# Patient Record
Sex: MALE | Race: WHITE | Employment: OTHER | URBAN - METROPOLITAN AREA
[De-identification: names, ages, dates, MRNs, and addresses within clinical notes are randomized per-mention and may not be internally consistent; named-entity substitution may affect disease eponyms.]

---

## 2017-05-24 PROBLEM — I71.40 ABDOMINAL AORTIC ANEURYSM (AAA) WITHOUT RUPTURE: Status: ACTIVE | Noted: 2017-05-24

## 2017-12-06 ENCOUNTER — HOSPITAL ENCOUNTER (OUTPATIENT)
Dept: CT IMAGING | Age: 72
Discharge: HOME | End: 2017-12-06
Payer: MEDICARE

## 2017-12-06 DIAGNOSIS — I71.40 AAA (ABDOMINAL AORTIC ANEURYSM): ICD-10-CM

## 2017-12-06 LAB — CREAT BLD-MCNC: 0.9 MG/DL (ref 0.6–1.3)

## 2017-12-06 PROCEDURE — 82565 ASSAY OF CREATININE: CPT

## 2017-12-06 PROCEDURE — 74011636320 HC RX REV CODE- 636/320

## 2017-12-06 PROCEDURE — 74174 CTA ABD&PLVS W/CONTRAST: CPT

## 2017-12-06 PROCEDURE — 74011000258 HC RX REV CODE- 258

## 2017-12-06 RX ORDER — SODIUM CHLORIDE 0.9 % (FLUSH) 0.9 %
10 SYRINGE (ML) INJECTION
Status: COMPLETED | OUTPATIENT
Start: 2017-12-06 | End: 2017-12-06

## 2017-12-06 RX ADMIN — SODIUM CHLORIDE 100 ML: 900 INJECTION, SOLUTION INTRAVENOUS at 15:20

## 2017-12-06 RX ADMIN — IOPAMIDOL 100 ML: 755 INJECTION, SOLUTION INTRAVENOUS at 15:20

## 2017-12-06 RX ADMIN — Medication 10 ML: at 15:20

## 2017-12-14 ENCOUNTER — HOSPITAL ENCOUNTER (OUTPATIENT)
Dept: GENERAL RADIOLOGY | Age: 72
Discharge: HOME | End: 2017-12-14
Payer: MEDICARE

## 2017-12-14 ENCOUNTER — HOSPITAL ENCOUNTER (OUTPATIENT)
Dept: PREADMISSION TESTING | Age: 72
Discharge: HOME | End: 2017-12-14
Payer: MEDICARE

## 2017-12-14 VITALS
RESPIRATION RATE: 22 BRPM | WEIGHT: 212.74 LBS | HEIGHT: 72 IN | SYSTOLIC BLOOD PRESSURE: 141 MMHG | OXYGEN SATURATION: 97 % | HEART RATE: 61 BPM | BODY MASS INDEX: 28.82 KG/M2 | TEMPERATURE: 98.1 F | DIASTOLIC BLOOD PRESSURE: 86 MMHG

## 2017-12-14 LAB
ABO + RH BLD: NORMAL
ALBUMIN SERPL-MCNC: 3.9 G/DL (ref 3.5–5)
ALBUMIN/GLOB SERPL: 1.3 {RATIO} (ref 1.1–2.2)
ALP SERPL-CCNC: 61 U/L (ref 45–117)
ALT SERPL-CCNC: 36 U/L (ref 12–78)
ANION GAP SERPL CALC-SCNC: 10 MMOL/L (ref 5–15)
APTT PPP: 35.5 SEC (ref 22.1–32.5)
AST SERPL-CCNC: 16 U/L (ref 15–37)
ATRIAL RATE: 59 BPM
BASOPHILS # BLD: 0 K/UL (ref 0–0.1)
BASOPHILS NFR BLD: 0 % (ref 0–1)
BILIRUB SERPL-MCNC: 0.4 MG/DL (ref 0.2–1)
BLOOD GROUP ANTIBODIES SERPL: NORMAL
BUN SERPL-MCNC: 16 MG/DL (ref 6–20)
BUN/CREAT SERPL: 18 (ref 12–20)
CALCIUM SERPL-MCNC: 9.2 MG/DL (ref 8.5–10.1)
CALCULATED P AXIS, ECG09: 34 DEGREES
CALCULATED R AXIS, ECG10: 6 DEGREES
CALCULATED T AXIS, ECG11: 34 DEGREES
CHLORIDE SERPL-SCNC: 106 MMOL/L (ref 97–108)
CO2 SERPL-SCNC: 27 MMOL/L (ref 21–32)
CREAT SERPL-MCNC: 0.9 MG/DL (ref 0.7–1.3)
DIAGNOSIS, 93000: NORMAL
EOSINOPHIL # BLD: 0.3 K/UL (ref 0–0.4)
EOSINOPHIL NFR BLD: 3 % (ref 0–7)
ERYTHROCYTE [DISTWIDTH] IN BLOOD BY AUTOMATED COUNT: 14.5 % (ref 11.5–14.5)
GLOBULIN SER CALC-MCNC: 3 G/DL (ref 2–4)
GLUCOSE SERPL-MCNC: 78 MG/DL (ref 65–100)
HCT VFR BLD AUTO: 44.8 % (ref 36.6–50.3)
HGB BLD-MCNC: 15.3 G/DL (ref 12.1–17)
INR PPP: 1 (ref 0.9–1.1)
LYMPHOCYTES # BLD: 2.4 K/UL (ref 0.8–3.5)
LYMPHOCYTES NFR BLD: 30 % (ref 12–49)
MCH RBC QN AUTO: 33.8 PG (ref 26–34)
MCHC RBC AUTO-ENTMCNC: 34.2 G/DL (ref 30–36.5)
MCV RBC AUTO: 99.1 FL (ref 80–99)
MONOCYTES # BLD: 0.8 K/UL (ref 0–1)
MONOCYTES NFR BLD: 10 % (ref 5–13)
NEUTS SEG # BLD: 4.6 K/UL (ref 1.8–8)
NEUTS SEG NFR BLD: 57 % (ref 32–75)
P-R INTERVAL, ECG05: 150 MS
PLATELET # BLD AUTO: 230 K/UL (ref 150–400)
POTASSIUM SERPL-SCNC: 4.5 MMOL/L (ref 3.5–5.1)
PROT SERPL-MCNC: 6.9 G/DL (ref 6.4–8.2)
PROTHROMBIN TIME: 10.5 SEC (ref 9–11.1)
Q-T INTERVAL, ECG07: 450 MS
QRS DURATION, ECG06: 106 MS
QTC CALCULATION (BEZET), ECG08: 445 MS
RBC # BLD AUTO: 4.52 M/UL (ref 4.1–5.7)
SODIUM SERPL-SCNC: 143 MMOL/L (ref 136–145)
SPECIMEN EXP DATE BLD: NORMAL
THERAPEUTIC RANGE,PTTT: ABNORMAL SECS (ref 58–77)
VENTRICULAR RATE, ECG03: 59 BPM
WBC # BLD AUTO: 8 K/UL (ref 4.1–11.1)

## 2017-12-14 PROCEDURE — 93005 ELECTROCARDIOGRAM TRACING: CPT

## 2017-12-14 PROCEDURE — 80053 COMPREHEN METABOLIC PANEL: CPT

## 2017-12-14 PROCEDURE — 86900 BLOOD TYPING SEROLOGIC ABO: CPT

## 2017-12-14 PROCEDURE — 85730 THROMBOPLASTIN TIME PARTIAL: CPT

## 2017-12-14 PROCEDURE — 36415 COLL VENOUS BLD VENIPUNCTURE: CPT

## 2017-12-14 PROCEDURE — 85025 COMPLETE CBC W/AUTO DIFF WBC: CPT

## 2017-12-14 PROCEDURE — 85610 PROTHROMBIN TIME: CPT

## 2017-12-14 PROCEDURE — 71020 XR CHEST PA LAT: CPT

## 2017-12-14 NOTE — PERIOP NOTES
[facility]  PREOPERATIVE INSTRUCTIONS    Surgery Date:   12/21/17      Surgery arrival time given by surgeon: NO  (If [facility] staff will call you between 3pm - 7pm the day before surgery with your arrival time. If your surgery is on a Monday, we will call you the preceding Friday. Please call [phone] after 7pm if you did not receive your arrival time.)  1. Report  to the 2nd Floor Admitting Desk on the day of your surgery. Bring your insurance card, photo identification, and any copayment (if applicable). 2. You must have a responsible adult to drive you home and stay with you the first 24 hours after surgery if you are going home the same day of your surgery. 3. Nothing to eat or drink after midnight the night before surgery. This means NO water, gum, mints, coffee, juice, etc.    4. MEDICATIONS TO TAKE THE MORNING OF SURGERY WITH A SIP OF WATER:  NONE  5. No alcoholic beverages 24 hours before and after your surgery. 6. If you are being admitted to the hospital,please leave personal belongings/luggage in your car until you have an assigned hospital room number. ( The hospital discharge time is 12 PM NOON. Your adult  should be at the hospital prior to the noon discharge time unless otherwise instructed.)   7. Do not take non-steroidal anti-inflammatory drugs (i.e. Ibuprofen, Naproxen, Advil, Aleve) as directed by your surgeon. You may take Tylenol. 8. Wear comfortable clothes. Wear your glasses instead of contacts. Please leave all money, jewelry and valuables at home. No make up, particularly mascara, the day of surgery. REMOVE ALL body piercings, rings,and jewelry and leave at home. Wear your hair loose or down, no pony-tails, buns, or any metal hair clips. 9. If you shower the morning of surgery, please do not apply any lotions, powders, or deodorants afterwards. Do not shave any body area within 24 hours of your surgery.   10. Please follow all instructions to avoid any potential surgical cancellation. 11. Should your physical condition change, (i.e. fever, cold, flu, etc.) please notify your surgeon as soon as possible. 12. It is important to be on time. If a situation occurs where you may be delayed, please call:  [phone] / [phone] on the day of surgery. 13. The Preadmission Testing staff can be reached at [phone]. 14. Special instructions: free  parking 7a-5p  15. The patient and spouse was contacted in person. He  verbalized understanding of all instructions does not  need reinforcement.

## 2017-12-20 ENCOUNTER — ANESTHESIA EVENT (OUTPATIENT)
Dept: SURGERY | Age: 72
DRG: 269 | End: 2017-12-20
Payer: MEDICARE

## 2017-12-21 ENCOUNTER — HOSPITAL ENCOUNTER (INPATIENT)
Age: 72
LOS: 1 days | Discharge: HOME | DRG: 269 | End: 2017-12-22
Payer: MEDICARE

## 2017-12-21 ENCOUNTER — ANESTHESIA (OUTPATIENT)
Dept: SURGERY | Age: 72
DRG: 269 | End: 2017-12-21
Payer: MEDICARE

## 2017-12-21 ENCOUNTER — APPOINTMENT (OUTPATIENT)
Dept: GENERAL RADIOLOGY | Age: 72
DRG: 269 | End: 2017-12-21
Payer: MEDICARE

## 2017-12-21 DIAGNOSIS — I71.40 ABDOMINAL AORTIC ANEURYSM (AAA) WITHOUT RUPTURE: Primary | ICD-10-CM

## 2017-12-21 PROCEDURE — 65660000000 HC RM CCU STEPDOWN

## 2017-12-21 PROCEDURE — 77030002933 HC SUT MCRYL J&J -A

## 2017-12-21 PROCEDURE — C1768 GRAFT, VASCULAR: HCPCS

## 2017-12-21 PROCEDURE — 77030005401 HC CATH RAD ARRO -A

## 2017-12-21 PROCEDURE — 76210000016 HC OR PH I REC 1 TO 1.5 HR

## 2017-12-21 PROCEDURE — C1769 GUIDE WIRE: HCPCS

## 2017-12-21 PROCEDURE — 77030004530 HC CATH ANGI DX IMGR BSC -A

## 2017-12-21 PROCEDURE — 74011636320 HC RX REV CODE- 636/320

## 2017-12-21 PROCEDURE — 77030016708 HC CATH ANGI DX SUPT2 CARD -B

## 2017-12-21 PROCEDURE — 77030011640 HC PAD GRND REM COVD -A

## 2017-12-21 PROCEDURE — 77030002986 HC SUT PROL J&J -A

## 2017-12-21 PROCEDURE — 77030003704 HC NDL VASC ACC ARMD -A

## 2017-12-21 PROCEDURE — 74011250636 HC RX REV CODE- 250/636: Performed by: ANESTHESIOLOGY

## 2017-12-21 PROCEDURE — 77030014647 HC SEAL FBRN TISSL BAXT -D

## 2017-12-21 PROCEDURE — 77030018673

## 2017-12-21 PROCEDURE — C1894 INTRO/SHEATH, NON-LASER: HCPCS

## 2017-12-21 PROCEDURE — 74011250636 HC RX REV CODE- 250/636

## 2017-12-21 PROCEDURE — 77030013079 HC BLNKT BAIR HGGR 3M -A: Performed by: ANESTHESIOLOGY

## 2017-12-21 PROCEDURE — 77030034850

## 2017-12-21 PROCEDURE — 77030010507 HC ADH SKN DERMBND J&J -B

## 2017-12-21 PROCEDURE — 77030031139 HC SUT VCRL2 J&J -A

## 2017-12-21 PROCEDURE — 77030013797 HC KT TRNSDUC PRSSR EDWD -A

## 2017-12-21 PROCEDURE — B41DYZZ FLUOROSCOPY OF AORTA AND BILATERAL LOWER EXTREMITY ARTERIES USING OTHER CONTRAST: ICD-10-PCS

## 2017-12-21 PROCEDURE — 04V03DZ RESTRICTION OF ABDOMINAL AORTA WITH INTRALUMINAL DEVICE, PERCUTANEOUS APPROACH: ICD-10-PCS

## 2017-12-21 PROCEDURE — 77030020256 HC SOL INJ NACL 0.9%  500ML

## 2017-12-21 PROCEDURE — 77030008771 HC TU NG SALEM SUMP -A: Performed by: ANESTHESIOLOGY

## 2017-12-21 PROCEDURE — 76001 XR FLUOROSCOPY OVER 60 MINUTES: CPT

## 2017-12-21 PROCEDURE — 77030020782 HC GWN BAIR PAWS FLX 3M -B

## 2017-12-21 PROCEDURE — 04703ZZ DILATION OF ABDOMINAL AORTA, PERCUTANEOUS APPROACH: ICD-10-PCS

## 2017-12-21 PROCEDURE — C1725 CATH, TRANSLUMIN NON-LASER: HCPCS

## 2017-12-21 PROCEDURE — 77030016570 HC BLNKT BAIR HGGR 3M -B

## 2017-12-21 PROCEDURE — 74011000250 HC RX REV CODE- 250

## 2017-12-21 PROCEDURE — 03HY32Z INSERTION OF MONITORING DEVICE INTO UPPER ARTERY, PERCUTANEOUS APPROACH: ICD-10-PCS

## 2017-12-21 PROCEDURE — 77030026438 HC STYL ET INTUB CARD -A: Performed by: ANESTHESIOLOGY

## 2017-12-21 PROCEDURE — 74011250637 HC RX REV CODE- 250/637

## 2017-12-21 PROCEDURE — 77030020061 HC IV BLD WRMR ADMIN SET 3M -B: Performed by: ANESTHESIOLOGY

## 2017-12-21 PROCEDURE — 76060000035 HC ANESTHESIA 2 TO 2.5 HR

## 2017-12-21 PROCEDURE — 76010000171 HC OR TIME 2 TO 2.5 HR INTENSV-TIER 1

## 2017-12-21 PROCEDURE — 77030008684 HC TU ET CUF COVD -B: Performed by: ANESTHESIOLOGY

## 2017-12-21 PROCEDURE — C1887 CATHETER, GUIDING: HCPCS

## 2017-12-21 DEVICE — STENT GRAFT ETLW1613C124E ENDUR II LIMB
Type: IMPLANTABLE DEVICE | Site: AORTA | Status: FUNCTIONAL
Brand: ENDURANT® II

## 2017-12-21 DEVICE — STENT GRAFT ESBF3214C103E ENDUR IIS BIF
Type: IMPLANTABLE DEVICE | Site: AORTA | Status: FUNCTIONAL
Brand: ENDURANT® IIS

## 2017-12-21 RX ORDER — SODIUM CHLORIDE, SODIUM LACTATE, POTASSIUM CHLORIDE, CALCIUM CHLORIDE 600; 310; 30; 20 MG/100ML; MG/100ML; MG/100ML; MG/100ML
125 INJECTION, SOLUTION INTRAVENOUS CONTINUOUS
Status: DISCONTINUED | OUTPATIENT
Start: 2017-12-21 | End: 2017-12-22 | Stop reason: HOSPADM

## 2017-12-21 RX ORDER — PROPOFOL 10 MG/ML
INJECTION, EMULSION INTRAVENOUS AS NEEDED
Status: DISCONTINUED | OUTPATIENT
Start: 2017-12-21 | End: 2017-12-21 | Stop reason: HOSPADM

## 2017-12-21 RX ORDER — ALLOPURINOL 300 MG/1
300 TABLET ORAL DAILY
Status: DISCONTINUED | OUTPATIENT
Start: 2017-12-21 | End: 2017-12-22 | Stop reason: HOSPADM

## 2017-12-21 RX ORDER — SODIUM CHLORIDE 0.9 % (FLUSH) 0.9 %
5-10 SYRINGE (ML) INJECTION AS NEEDED
Status: DISCONTINUED | OUTPATIENT
Start: 2017-12-21 | End: 2017-12-22 | Stop reason: HOSPADM

## 2017-12-21 RX ORDER — HYDROMORPHONE HYDROCHLORIDE 2 MG/ML
INJECTION, SOLUTION INTRAMUSCULAR; INTRAVENOUS; SUBCUTANEOUS AS NEEDED
Status: DISCONTINUED | OUTPATIENT
Start: 2017-12-21 | End: 2017-12-21 | Stop reason: HOSPADM

## 2017-12-21 RX ORDER — EPHEDRINE SULFATE 50 MG/ML
INJECTION, SOLUTION INTRAVENOUS AS NEEDED
Status: DISCONTINUED | OUTPATIENT
Start: 2017-12-21 | End: 2017-12-21 | Stop reason: HOSPADM

## 2017-12-21 RX ORDER — SODIUM CHLORIDE, SODIUM LACTATE, POTASSIUM CHLORIDE, CALCIUM CHLORIDE 600; 310; 30; 20 MG/100ML; MG/100ML; MG/100ML; MG/100ML
125 INJECTION, SOLUTION INTRAVENOUS CONTINUOUS
Status: DISCONTINUED | OUTPATIENT
Start: 2017-12-21 | End: 2017-12-21 | Stop reason: HOSPADM

## 2017-12-21 RX ORDER — CEFAZOLIN SODIUM IN 0.9 % NACL 2 G/50 ML
2 INTRAVENOUS SOLUTION, PIGGYBACK (ML) INTRAVENOUS ONCE
Status: COMPLETED | OUTPATIENT
Start: 2017-12-21 | End: 2017-12-21

## 2017-12-21 RX ORDER — LIDOCAINE HYDROCHLORIDE 10 MG/ML
0.1 INJECTION, SOLUTION EPIDURAL; INFILTRATION; INTRACAUDAL; PERINEURAL AS NEEDED
Status: DISCONTINUED | OUTPATIENT
Start: 2017-12-21 | End: 2017-12-21 | Stop reason: HOSPADM

## 2017-12-21 RX ORDER — ROCURONIUM BROMIDE 10 MG/ML
INJECTION, SOLUTION INTRAVENOUS AS NEEDED
Status: DISCONTINUED | OUTPATIENT
Start: 2017-12-21 | End: 2017-12-21 | Stop reason: HOSPADM

## 2017-12-21 RX ORDER — ONDANSETRON 2 MG/ML
4 INJECTION INTRAMUSCULAR; INTRAVENOUS AS NEEDED
Status: DISCONTINUED | OUTPATIENT
Start: 2017-12-21 | End: 2017-12-22 | Stop reason: HOSPADM

## 2017-12-21 RX ORDER — HYDROCODONE BITARTRATE AND ACETAMINOPHEN 5; 325 MG/1; MG/1
1 TABLET ORAL
Status: DISCONTINUED | OUTPATIENT
Start: 2017-12-21 | End: 2017-12-22 | Stop reason: HOSPADM

## 2017-12-21 RX ORDER — ONDANSETRON 2 MG/ML
INJECTION INTRAMUSCULAR; INTRAVENOUS AS NEEDED
Status: DISCONTINUED | OUTPATIENT
Start: 2017-12-21 | End: 2017-12-21 | Stop reason: HOSPADM

## 2017-12-21 RX ORDER — SODIUM CHLORIDE 0.9 % (FLUSH) 0.9 %
5-10 SYRINGE (ML) INJECTION EVERY 8 HOURS
Status: DISCONTINUED | OUTPATIENT
Start: 2017-12-21 | End: 2017-12-21 | Stop reason: HOSPADM

## 2017-12-21 RX ORDER — SODIUM CHLORIDE 0.9 % (FLUSH) 0.9 %
5-10 SYRINGE (ML) INJECTION EVERY 8 HOURS
Status: DISCONTINUED | OUTPATIENT
Start: 2017-12-21 | End: 2017-12-22 | Stop reason: HOSPADM

## 2017-12-21 RX ORDER — HYDROMORPHONE HYDROCHLORIDE 2 MG/ML
.5-1 INJECTION, SOLUTION INTRAMUSCULAR; INTRAVENOUS; SUBCUTANEOUS
Status: DISCONTINUED | OUTPATIENT
Start: 2017-12-21 | End: 2017-12-22 | Stop reason: HOSPADM

## 2017-12-21 RX ORDER — SODIUM CHLORIDE, SODIUM LACTATE, POTASSIUM CHLORIDE, CALCIUM CHLORIDE 600; 310; 30; 20 MG/100ML; MG/100ML; MG/100ML; MG/100ML
75 INJECTION, SOLUTION INTRAVENOUS CONTINUOUS
Status: DISCONTINUED | OUTPATIENT
Start: 2017-12-21 | End: 2017-12-22 | Stop reason: HOSPADM

## 2017-12-21 RX ORDER — SODIUM CHLORIDE, SODIUM LACTATE, POTASSIUM CHLORIDE, CALCIUM CHLORIDE 600; 310; 30; 20 MG/100ML; MG/100ML; MG/100ML; MG/100ML
INJECTION, SOLUTION INTRAVENOUS
Status: DISCONTINUED | OUTPATIENT
Start: 2017-12-21 | End: 2017-12-21 | Stop reason: HOSPADM

## 2017-12-21 RX ORDER — SUCCINYLCHOLINE CHLORIDE 20 MG/ML
INJECTION INTRAMUSCULAR; INTRAVENOUS AS NEEDED
Status: DISCONTINUED | OUTPATIENT
Start: 2017-12-21 | End: 2017-12-21 | Stop reason: HOSPADM

## 2017-12-21 RX ORDER — HYDROMORPHONE HYDROCHLORIDE 2 MG/ML
1 INJECTION, SOLUTION INTRAMUSCULAR; INTRAVENOUS; SUBCUTANEOUS
Status: DISCONTINUED | OUTPATIENT
Start: 2017-12-21 | End: 2017-12-22 | Stop reason: HOSPADM

## 2017-12-21 RX ORDER — FENTANYL CITRATE 50 UG/ML
INJECTION, SOLUTION INTRAMUSCULAR; INTRAVENOUS AS NEEDED
Status: DISCONTINUED | OUTPATIENT
Start: 2017-12-21 | End: 2017-12-21 | Stop reason: HOSPADM

## 2017-12-21 RX ORDER — PROTAMINE SULFATE 10 MG/ML
INJECTION, SOLUTION INTRAVENOUS AS NEEDED
Status: DISCONTINUED | OUTPATIENT
Start: 2017-12-21 | End: 2017-12-21 | Stop reason: HOSPADM

## 2017-12-21 RX ORDER — ASPIRIN 81 MG/1
81 TABLET ORAL DAILY
Status: DISCONTINUED | OUTPATIENT
Start: 2017-12-21 | End: 2017-12-22 | Stop reason: HOSPADM

## 2017-12-21 RX ORDER — DEXAMETHASONE SODIUM PHOSPHATE 4 MG/ML
INJECTION, SOLUTION INTRA-ARTICULAR; INTRALESIONAL; INTRAMUSCULAR; INTRAVENOUS; SOFT TISSUE AS NEEDED
Status: DISCONTINUED | OUTPATIENT
Start: 2017-12-21 | End: 2017-12-21 | Stop reason: HOSPADM

## 2017-12-21 RX ORDER — HEPARIN SODIUM 5000 [USP'U]/ML
5000 INJECTION, SOLUTION INTRAVENOUS; SUBCUTANEOUS EVERY 8 HOURS
Status: DISCONTINUED | OUTPATIENT
Start: 2017-12-21 | End: 2017-12-22 | Stop reason: HOSPADM

## 2017-12-21 RX ORDER — HEPARIN SODIUM 1000 [USP'U]/ML
INJECTION, SOLUTION INTRAVENOUS; SUBCUTANEOUS AS NEEDED
Status: DISCONTINUED | OUTPATIENT
Start: 2017-12-21 | End: 2017-12-21 | Stop reason: HOSPADM

## 2017-12-21 RX ORDER — SODIUM CHLORIDE 0.9 % (FLUSH) 0.9 %
5-10 SYRINGE (ML) INJECTION AS NEEDED
Status: DISCONTINUED | OUTPATIENT
Start: 2017-12-21 | End: 2017-12-21 | Stop reason: HOSPADM

## 2017-12-21 RX ORDER — LIDOCAINE HYDROCHLORIDE 20 MG/ML
INJECTION, SOLUTION EPIDURAL; INFILTRATION; INTRACAUDAL; PERINEURAL AS NEEDED
Status: DISCONTINUED | OUTPATIENT
Start: 2017-12-21 | End: 2017-12-21 | Stop reason: HOSPADM

## 2017-12-21 RX ORDER — MIDAZOLAM HYDROCHLORIDE 1 MG/ML
INJECTION, SOLUTION INTRAMUSCULAR; INTRAVENOUS AS NEEDED
Status: DISCONTINUED | OUTPATIENT
Start: 2017-12-21 | End: 2017-12-21 | Stop reason: HOSPADM

## 2017-12-21 RX ADMIN — HEPARIN SODIUM 5000 UNITS: 1000 INJECTION, SOLUTION INTRAVENOUS; SUBCUTANEOUS at 13:06

## 2017-12-21 RX ADMIN — EPHEDRINE SULFATE 7.5 MG: 50 INJECTION, SOLUTION INTRAVENOUS at 13:10

## 2017-12-21 RX ADMIN — EPHEDRINE SULFATE 5 MG: 50 INJECTION, SOLUTION INTRAVENOUS at 13:15

## 2017-12-21 RX ADMIN — LIDOCAINE HYDROCHLORIDE 50 MG: 20 INJECTION, SOLUTION EPIDURAL; INFILTRATION; INTRACAUDAL; PERINEURAL at 12:32

## 2017-12-21 RX ADMIN — Medication 10 ML: at 17:28

## 2017-12-21 RX ADMIN — FENTANYL CITRATE 50 MCG: 50 INJECTION, SOLUTION INTRAMUSCULAR; INTRAVENOUS at 12:59

## 2017-12-21 RX ADMIN — MIDAZOLAM HYDROCHLORIDE 2 MG: 1 INJECTION, SOLUTION INTRAMUSCULAR; INTRAVENOUS at 12:25

## 2017-12-21 RX ADMIN — ROCURONIUM BROMIDE 30 MG: 10 INJECTION, SOLUTION INTRAVENOUS at 12:47

## 2017-12-21 RX ADMIN — HYDROCODONE BITARTRATE AND ACETAMINOPHEN 1 TABLET: 5; 325 TABLET ORAL at 18:04

## 2017-12-21 RX ADMIN — SODIUM CHLORIDE, SODIUM LACTATE, POTASSIUM CHLORIDE, AND CALCIUM CHLORIDE 75 ML/HR: 600; 310; 30; 20 INJECTION, SOLUTION INTRAVENOUS at 15:01

## 2017-12-21 RX ADMIN — EPHEDRINE SULFATE 5 MG: 50 INJECTION, SOLUTION INTRAVENOUS at 13:53

## 2017-12-21 RX ADMIN — PROTAMINE SULFATE 25 MG: 10 INJECTION, SOLUTION INTRAVENOUS at 14:02

## 2017-12-21 RX ADMIN — ALLOPURINOL 300 MG: 300 TABLET ORAL at 17:27

## 2017-12-21 RX ADMIN — PROPOFOL 30 MG: 10 INJECTION, EMULSION INTRAVENOUS at 12:59

## 2017-12-21 RX ADMIN — ASPIRIN 81 MG: 81 TABLET, COATED ORAL at 17:27

## 2017-12-21 RX ADMIN — Medication 10 ML: at 22:00

## 2017-12-21 RX ADMIN — HYDROMORPHONE HYDROCHLORIDE 0.5 MG: 2 INJECTION, SOLUTION INTRAMUSCULAR; INTRAVENOUS; SUBCUTANEOUS at 13:42

## 2017-12-21 RX ADMIN — SODIUM CHLORIDE, SODIUM LACTATE, POTASSIUM CHLORIDE, AND CALCIUM CHLORIDE 125 ML/HR: 600; 310; 30; 20 INJECTION, SOLUTION INTRAVENOUS at 10:26

## 2017-12-21 RX ADMIN — CEFAZOLIN 2 G: 1 INJECTION, POWDER, FOR SOLUTION INTRAMUSCULAR; INTRAVENOUS; PARENTERAL at 12:47

## 2017-12-21 RX ADMIN — SODIUM CHLORIDE, SODIUM LACTATE, POTASSIUM CHLORIDE, CALCIUM CHLORIDE: 600; 310; 30; 20 INJECTION, SOLUTION INTRAVENOUS at 13:54

## 2017-12-21 RX ADMIN — FENTANYL CITRATE 50 MCG: 50 INJECTION, SOLUTION INTRAMUSCULAR; INTRAVENOUS at 14:10

## 2017-12-21 RX ADMIN — ONDANSETRON 4 MG: 2 INJECTION INTRAMUSCULAR; INTRAVENOUS at 12:40

## 2017-12-21 RX ADMIN — HEPARIN SODIUM 5000 UNITS: 5000 INJECTION, SOLUTION INTRAVENOUS; SUBCUTANEOUS at 22:00

## 2017-12-21 RX ADMIN — SODIUM CHLORIDE, SODIUM LACTATE, POTASSIUM CHLORIDE, CALCIUM CHLORIDE: 600; 310; 30; 20 INJECTION, SOLUTION INTRAVENOUS at 12:41

## 2017-12-21 RX ADMIN — EPHEDRINE SULFATE 10 MG: 50 INJECTION, SOLUTION INTRAVENOUS at 12:37

## 2017-12-21 RX ADMIN — ROCURONIUM BROMIDE 5 MG: 10 INJECTION, SOLUTION INTRAVENOUS at 12:32

## 2017-12-21 RX ADMIN — DEXAMETHASONE SODIUM PHOSPHATE 4 MG: 4 INJECTION, SOLUTION INTRA-ARTICULAR; INTRALESIONAL; INTRAMUSCULAR; INTRAVENOUS; SOFT TISSUE at 12:41

## 2017-12-21 RX ADMIN — FENTANYL CITRATE 100 MCG: 50 INJECTION, SOLUTION INTRAMUSCULAR; INTRAVENOUS at 12:32

## 2017-12-21 RX ADMIN — MIDAZOLAM HYDROCHLORIDE 2 MG: 1 INJECTION, SOLUTION INTRAMUSCULAR; INTRAVENOUS at 11:15

## 2017-12-21 RX ADMIN — SUCCINYLCHOLINE CHLORIDE 100 MG: 20 INJECTION INTRAMUSCULAR; INTRAVENOUS at 12:32

## 2017-12-21 RX ADMIN — FENTANYL CITRATE 50 MCG: 50 INJECTION, SOLUTION INTRAMUSCULAR; INTRAVENOUS at 12:50

## 2017-12-21 NOTE — PROGRESS NOTES
1520: TRANSFER - IN REPORT:    Verbal report received from Nate Carter, [facility] (name) on Nina Vitale  being received from [employer]) for routine progression of care      Report consisted of patients Situation, Background, Assessment and   Recommendations(SBAR). Information from the following report(s) SBAR, Procedure Summary, Intake/Output, MAR, Accordion, Recent Results, Med Rec Status and Cardiac Rhythm NSR w. PAC's was reviewed with the receiving nurse. Opportunity for questions and clarification was provided. Assessment completed upon patients arrival to unit and care assumed. 1548: Patient arrives IVCU 4 from PACU on bed. Patient is awake and alert, drowsy, answers questions appropriately. Denies pain. A line zeroed. VSS. Bilateral groin sites clean, dry, intact with primapore dressing. IVF infusing at 75ml/hr. Assessment complete. Bilateral pedal pulses palpable see flowsheet. Primary Nurse Diane Morales and Scherry Oppenheim, RN performed a dual skin assessment on this patient No impairment noted  Marcellus score is 18 . Scratches noted on left butt cheek and scattered on abdomen.

## 2017-12-21 NOTE — ANESTHESIA PREPROCEDURE EVALUATION
Anesthetic History   No history of anesthetic complications            Review of Systems / Medical History  Patient summary reviewed, nursing notes reviewed and pertinent labs reviewed    Pulmonary          Smoker         Neuro/Psych   Within defined limits           Cardiovascular              PAD (AAA, carotid stenosis, cerebral aneurysm s/p coiling)    Exercise tolerance: >4 METS     GI/Hepatic/Renal  Within defined limits              Endo/Other  Within defined limits           Other Findings   Comments:  shunt  Gout  BPH         Physical Exam    Airway  Mallampati: II  TM Distance: 4 - 6 cm  Neck ROM: normal range of motion   Mouth opening: Normal     Cardiovascular    Rhythm: regular  Rate: normal         Dental    Dentition: Full upper dentures and Full lower dentures     Pulmonary  Breath sounds clear to auscultation               Abdominal         Other Findings            Anesthetic Plan    ASA: 3  Anesthesia type: general          Induction: Intravenous  Anesthetic plan and risks discussed with: Patient

## 2017-12-21 NOTE — BRIEF OP NOTE
BRIEF OPERATIVE NOTE    Date of Procedure: 12/21/2017   Preoperative Diagnosis: AAA  Postoperative Diagnosis: AAA    Procedure(s):  ENDOVASCULAR AORTIC ABDOMINAL ANEURYSM REPAIR   Surgeon(s) and Role:     * Ciaran Smith MD - Primary         Assistant Staff:       Surgical Staff:  Circ-1: Stefani Casillas RN  Circ-2: Alexa Cote  Scrub Tech-1: Cindy Silva  Surg Asst-1: Peng Ibarra  Event Time In   Incision Start 1255   Incision Close 1420     Anesthesia: General   Estimated Blood Loss: 50  Specimens: * No specimens in log *   Findings: 0   Complications: 0  Implants:   Implant Name Type Inv.  Item Serial No.  Lot No. LRB No. Used Action   GRAFT STNT BIFUR [id number] -- ENDURANT II - [id number] Stent GRAFT STNT BIFUR [id number] -- ENDURANT II [id number] [employer] N/A N/A 1 Implanted   GRAFT STNT CONTRA [id number] -- ENDURANT II - [id number] Stent GRAFT STNT CONTRA [id number] -- ENDURANT II [id number] [employer] N/A N/A 1 Implanted   GRAFT STNT CONTRA [id number] -- ENDURANT II - [id number] Stent GRAFT Jolieen Girt [id number] -- ENDURANT II [id number] [employer]   N/A 1 Implanted

## 2017-12-21 NOTE — ANESTHESIA PROCEDURE NOTES
Arterial Line Placement    Start time: 12/21/2017 11:15 AM  End time: 12/21/2017 11:22 AM  Performed by: Jeancarlos López  Authorized by: Reinaldo ADAMS     Pre-Procedure  Indications:  Arterial pressure monitoring  Preanesthetic Checklist: patient identified, risks and benefits discussed, anesthesia consent, site marked, patient being monitored, timeout performed and patient being monitored    Timeout Time: 11:15        Procedure:   Prep:  Alcohol  Seldinger Technique?: Yes    Orientation:  Right  Location:  Radial artery  Catheter size:  20 G  Number of attempts:  2  Cont Cardiac Output Sensor: No      Assessment:   Post-procedure:  Line secured and sterile dressing applied  Patient Tolerance:  Patient tolerated the procedure well with no immediate complications

## 2017-12-21 NOTE — ANESTHESIA POSTPROCEDURE EVALUATION
Post-Anesthesia Evaluation and Assessment    Patient: Deepali Lazar MRN: [id number]  SSN: [id number]    YOB: 1945  Age: 67 y.o. Sex: male       Cardiovascular Function/Vital Signs  Visit Vitals    BP (!) 140/102    Pulse 72    Temp 36.6 °C (97.8 °F)    Resp 12    Ht 6' (1.829 m)    Wt 96.5 kg (212 lb 11.9 oz)    SpO2 94%    BMI 28.85 kg/m2       Patient is status post general anesthesia for Procedure(s):  ENDOVASCULAR AORTIC ABDOMINAL ANEURYSM REPAIR . Nausea/Vomiting: None    Postoperative hydration reviewed and adequate. Pain:  Pain Scale 1: Numeric (0 - 10) (12/21/17 1833)  Pain Intensity 1: 0 (12/21/17 1833)   Managed    Neurological Status:   Neuro (WDL): Exceptions to WDL (12/21/17 1520)  Neuro  Neurologic State: Alert;Drowsy; Eyes open spontaneously (12/21/17 1553)  Orientation Level: Oriented X4 (12/21/17 1553)  Cognition: Appropriate decision making; Appropriate for age attention/concentration; Appropriate safety awareness;Decreased attention/concentration; Follows commands;Recognition of people/places (12/21/17 1553)  Speech: Clear (12/21/17 1553)  LUE Motor Response: Purposeful (12/21/17 1553)  LLE Motor Response: Purposeful (12/21/17 1553)  RUE Motor Response: Purposeful (12/21/17 1553)  RLE Motor Response: Purposeful (12/21/17 1553)   At baseline    Mental Status and Level of Consciousness: Arousable    Pulmonary Status:   O2 Device: Nasal cannula (12/21/17 1553)   Adequate oxygenation and airway patent    Complications related to anesthesia: None    Post-anesthesia assessment completed.  No concerns    Signed By: Rodrigo Paulson MD     December 21, 2017

## 2017-12-21 NOTE — IP AVS SNAPSHOT
[address] [address] [phone] Patient: Russ Wang MRN: [id number] FAT:5/69/5254 My Medications TAKE these medications as instructed Instructions Each Dose to Equal  
 Morning Noon Evening Bedtime  
 allopurinol 300 mg tablet Commonly known as:  Deyabandar Dasilvaber Your last dose was: Your next dose is: TAKE 1 TAB BY MOUTH DAILY. ASPIRIN LOW DOSE PO Your last dose was: Your next dose is: Take 81 mg by mouth daily. 81 mg  
    
   
   
   
  
 cholecalciferol 1,000 unit Cap Commonly known as:  VITAMIN D3 Your last dose was: Your next dose is: Take 1,000 Units by mouth daily. 1000 Units  
    
   
   
   
  
 ergocalciferol 50,000 unit capsule Commonly known as:  ERGOCALCIFEROL Your last dose was: Your next dose is: TAKE 1 CAPSULE BY MOUTH WEEKLY HYDROcodone-acetaminophen 5-325 mg per tablet Commonly known as:  Deepa Wolf Your last dose was: Your next dose is: Take 1 Tab by mouth every four (4) hours as needed. Max Daily Amount: 6 Tabs. 1 Tab Where to Get Your Medications Information on where to get these meds will be given to you by the nurse or doctor. ! Ask your nurse or doctor about these medications HYDROcodone-acetaminophen 5-325 mg per tablet

## 2017-12-21 NOTE — PROGRESS NOTES
Problem: Falls - Risk of  Goal: *Absence of Falls  Document Armando Fall Risk and appropriate interventions in the flowsheet.    Outcome: Progressing Towards Goal  Fall Risk Interventions:  Mobility Interventions: Bed/chair exit alarm, Communicate number of staff needed for ambulation/transfer, OT consult for ADLs, Patient to call before getting OOB, PT Consult for mobility concerns, PT Consult for assist device competence         Medication Interventions: Assess postural VS orthostatic hypotension, Bed/chair exit alarm, Evaluate medications/consider consulting pharmacy, Patient to call before getting OOB, Teach patient to arise slowly, Utilize gait belt for transfers/ambulation    Elimination Interventions: Bed/chair exit alarm, Call light in reach, Patient to call for help with toileting needs, Toilet paper/wipes in reach, Toileting schedule/hourly rounds

## 2017-12-21 NOTE — ROUTINE PROCESS
TRANSFER - OUT REPORT:    Verbal report given to [facility] [address] on [employer]  being transferred to [place] [address] for routine post - op       Report consisted of patients Situation, Background, Assessment and   Recommendations(SBAR). Information from the following report(s) SBAR and OR Summary was reviewed with the receiving nurse. Lines:   Peripheral IV 12/21/17 Left Hand (Active)   Site Assessment Clean, dry, & intact 12/21/2017  2:35 PM   Phlebitis Assessment 0 12/21/2017  2:35 PM   Infiltration Assessment 0 12/21/2017  2:35 PM   Dressing Status Clean, dry, & intact 12/21/2017  2:35 PM   Dressing Type Tape;Transparent 12/21/2017  2:35 PM   Hub Color/Line Status Pink 12/21/2017  2:35 PM   Action Taken Open ports on tubing capped 12/21/2017 10:25 AM   Alcohol Cap Used Yes 12/21/2017 10:25 AM       Peripheral IV 12/21/17 Right Forearm (Active)   Site Assessment Clean, dry, & intact 12/21/2017  2:35 PM   Phlebitis Assessment 0 12/21/2017  2:35 PM   Infiltration Assessment 0 12/21/2017  2:35 PM   Dressing Status Clean, dry, & intact 12/21/2017  2:35 PM   Dressing Type Tape;Transparent 12/21/2017  2:35 PM   Hub Color/Line Status Green 12/21/2017  2:35 PM       Arterial Line 12/21/17 Right Radial artery (Active)   Site Assessment Clean, dry, & intact 12/21/2017  2:35 PM   Dressing Status Clean, dry, & intact 12/21/2017  2:35 PM   Dressing Type Tape;Transparent 12/21/2017  2:35 PM   Line Status Intact and in place 12/21/2017  2:35 PM   Treatment Zeroed or re-zeroed 12/21/2017  2:35 PM   Affected Extremity/Extremities Color distal to insertion site pink (or appropriate for race) 12/21/2017  2:35 PM        Opportunity for questions and clarification was provided.       Patient transported with:   Monitor  O2 @ 3 liters  Registered Nurse

## 2017-12-21 NOTE — IP AVS SNAPSHOT
[address] [facility] [address] [address] 
[phone] Patient: Darrick Grover MRN: [id number] UD About your hospitalization You were admitted on:  2017 You last received care in the:  [facility] You were discharged on:  2017 Why you were hospitalized Your primary diagnosis was:  Not on File Your diagnoses also included:  Aaa (Abdominal Aortic Aneurysm) (Hcc) Things You Need To Do (next 8 weeks) Follow up with Addy Nguyen MD  
  
Phone:  [phone] Where:  [address] [place] [phone] Discharge Orders None A check michelle indicates which time of day the medication should be taken. My Medications TAKE these medications as instructed Instructions Each Dose to Equal  
 Morning Noon Evening Bedtime  
 allopurinol 300 mg tablet Commonly known as:  Silvana Dakins Your last dose was: Your next dose is: TAKE 1 TAB BY MOUTH DAILY. ASPIRIN LOW DOSE PO Your last dose was: Your next dose is: Take 81 mg by mouth daily. 81 mg  
    
   
   
   
  
 cholecalciferol 1,000 unit Cap Commonly known as:  VITAMIN D3 Your last dose was: Your next dose is: Take 1,000 Units by mouth daily. 1000 Units  
    
   
   
   
  
 ergocalciferol 50,000 unit capsule Commonly known as:  ERGOCALCIFEROL Your last dose was: Your next dose is: TAKE 1 CAPSULE BY MOUTH WEEKLY HYDROcodone-acetaminophen 5-325 mg per tablet Commonly known as:  Jagdish Dolphin Your last dose was: Your next dose is: Take 1 Tab by mouth every four (4) hours as needed. Max Daily Amount: 6 Tabs. 1 Tab Where to Get Your Medications Information on where to get these meds will be given to you by the nurse or doctor. ! Ask your nurse or doctor about these medications HYDROcodone-acetaminophen 5-325 mg per tablet Discharge Instructions Patient Discharge Instructions Arya Yoder / [id number] : 1945 Admitted 2017 Discharged: 2017 Take Home Medications · It is important that you take the medication exactly as they are prescribed. · Keep your medication in the bottles provided by the pharmacist and keep a list of the medication names, dosages, and times to be taken in your wallet. · Do not take other medications without consulting your doctor. What to do at [facility] Recommended diet: Regular Diet, Recommended activity: Activity as tolerated, Follow-up with Dr Romario Smith in 2 weeks Information obtained by : 
I understand that if any problems occur once I am at home I am to contact my physician. I understand and acknowledge receipt of the instructions indicated above. Physician's or R.N.'s Signature                                                                  Date/Time Patient or Representative Signature                                                          Date/Time [facility] Announcement We are excited to announce that we are making your provider's discharge notes available to you in [employer]. You will see these notes when they are completed and signed by the physician that discharged you from your recent hospital stay.   If you have any questions or concerns about any information you see in [place], please call the [employer] [facility] Information Department where you were seen or reach out to your Primary Care Provider for more information about your plan of care. Introducing [facility]! Trace Alatorre introduces [employer] patient portal. Now you can access parts of your medical record, email your doctor's office, and request medication refills online. 1. In your internet browser, go to [email or web address] 2. Click on the First Time User? Click Here link in the Sign In box. You will see the New Member Sign Up page. 3. Enter your [facility] Access Code exactly as it appears below. You will not need to use this code after youve completed the sign-up process. If you do not sign up before the expiration date, you must request a new code. · [employer] Access Code: [email or web address] Expires: 3/14/2018 10:19 AM 
 
4. Enter the last four digits of your Social Security Number (xxxx) and Date of Birth (mm/dd/yyyy) as indicated and click Submit. You will be taken to the next sign-up page. 5. Create a [facility] ID. This will be your [facility] login ID and cannot be changed, so think of one that is secure and easy to remember. 6. Create a [facility] password. You can change your password at any time. 7. Enter your Password Reset Question and Answer. This can be used at a later time if you forget your password. 8. Enter your e-mail address. You will receive e-mail notification when new information is available in [address] 9. Click Sign Up. You can now view and download portions of your medical record. 10. Click the Download Summary menu link to download a portable copy of your medical information. If you have questions, please visit the Frequently Asked Questions section of the [employer] website. Remember, [facility] is NOT to be used for urgent needs. For medical emergencies, dial 911. Now available from your iPhone and Android! Unresulted Labs-Please follow up with your PCP about these lab tests Order Current Status CULTURE, MRSA In process Providers Seen During Your Hospitalization Provider Specialty Primary office phone Cassandra Guy MD General and Vascular Surgery [phone] Your Primary Care Physician (PCP) Primary Care Physician Office Phone Office Fax Faith Sotelo [phone] You are allergic to the following No active allergies Recent Documentation Height Weight BMI Smoking Status 1.829 m 96.5 kg 28.85 kg/m2 Current Every Day Smoker Emergency Contacts Name Discharge Info Relation Home Work Mobile [address] CAREGIVER [3] Spouse [3] [phone] Patient Belongings The following personal items are in your possession at time of discharge: 
  Dental Appliances: Lowers, Uppers, With patient  Visual Aid: Glasses, With patient   Hearing Aids/Status: Does not own  Home Medications: None   Jewelry: None  Clothing: Pants, Undergarments, Shirt, Footwear, Socks, Jacket/Coat (placed in PACU)    Other Valuables: Cell Phone, Karli White (given to wife) Please provide this summary of care documentation to your next provider. Signatures-by signing, you are acknowledging that this After Visit Summary has been reviewed with you and you have received a copy. Patient Signature:  ____________________________________________________________ Date:  ____________________________________________________________  
  
Pieter Artist Provider Signature:  ____________________________________________________________ Date:  ____________________________________________________________

## 2017-12-22 VITALS
TEMPERATURE: 98.4 F | DIASTOLIC BLOOD PRESSURE: 89 MMHG | SYSTOLIC BLOOD PRESSURE: 135 MMHG | WEIGHT: 212.74 LBS | BODY MASS INDEX: 28.82 KG/M2 | RESPIRATION RATE: 16 BRPM | HEIGHT: 72 IN | OXYGEN SATURATION: 93 % | HEART RATE: 69 BPM

## 2017-12-22 LAB
BACTERIA SPEC CULT: NORMAL
BACTERIA SPEC CULT: NORMAL
BASOPHILS # BLD: 0 K/UL (ref 0–0.1)
BASOPHILS NFR BLD: 0 % (ref 0–1)
EOSINOPHIL # BLD: 0 K/UL (ref 0–0.4)
EOSINOPHIL NFR BLD: 0 % (ref 0–7)
ERYTHROCYTE [DISTWIDTH] IN BLOOD BY AUTOMATED COUNT: 14.2 % (ref 11.5–14.5)
HCT VFR BLD AUTO: 37.3 % (ref 36.6–50.3)
HGB BLD-MCNC: 12.8 G/DL (ref 12.1–17)
LYMPHOCYTES # BLD: 1.4 K/UL (ref 0.8–3.5)
LYMPHOCYTES NFR BLD: 11 % (ref 12–49)
MCH RBC QN AUTO: 33.2 PG (ref 26–34)
MCHC RBC AUTO-ENTMCNC: 34.3 G/DL (ref 30–36.5)
MCV RBC AUTO: 96.9 FL (ref 80–99)
MONOCYTES # BLD: 1.1 K/UL (ref 0–1)
MONOCYTES NFR BLD: 8 % (ref 5–13)
NEUTS SEG # BLD: 10.3 K/UL (ref 1.8–8)
NEUTS SEG NFR BLD: 81 % (ref 32–75)
PLATELET # BLD AUTO: 199 K/UL (ref 150–400)
RBC # BLD AUTO: 3.85 M/UL (ref 4.1–5.7)
SERVICE CMNT-IMP: NORMAL
WBC # BLD AUTO: 12.8 K/UL (ref 4.1–11.1)

## 2017-12-22 PROCEDURE — 74011250637 HC RX REV CODE- 250/637

## 2017-12-22 PROCEDURE — 36415 COLL VENOUS BLD VENIPUNCTURE: CPT

## 2017-12-22 PROCEDURE — 77010033678 HC OXYGEN DAILY

## 2017-12-22 PROCEDURE — 85025 COMPLETE CBC W/AUTO DIFF WBC: CPT

## 2017-12-22 PROCEDURE — 74011250636 HC RX REV CODE- 250/636

## 2017-12-22 RX ORDER — HYDROCODONE BITARTRATE AND ACETAMINOPHEN 5; 325 MG/1; MG/1
1 TABLET ORAL
Qty: 30 TAB | Refills: 0 | Status: SHIPPED | OUTPATIENT
Start: 2017-12-22 | End: 2018-05-29

## 2017-12-22 RX ADMIN — HEPARIN SODIUM 5000 UNITS: 5000 INJECTION, SOLUTION INTRAVENOUS; SUBCUTANEOUS at 05:30

## 2017-12-22 RX ADMIN — ASPIRIN 81 MG: 81 TABLET, COATED ORAL at 09:22

## 2017-12-22 RX ADMIN — ALLOPURINOL 300 MG: 300 TABLET ORAL at 09:22

## 2017-12-22 RX ADMIN — Medication 10 ML: at 05:20

## 2017-12-22 NOTE — ROUTINE PROCESS
0700 Shift change report received from Jose Man [facility]. SBAR, Kardex, Procedure Summary, Intake/Output, MAR, Accordion, Recent Results and Cardiac Rhythm SR reviewed.

## 2017-12-22 NOTE — DISCHARGE INSTRUCTIONS
Patient Discharge Instructions    Giles Wallace / [id number] : 1945    Admitted 2017 Discharged: 2017     Take Home Medications            · It is important that you take the medication exactly as they are prescribed. · Keep your medication in the bottles provided by the pharmacist and keep a list of the medication names, dosages, and times to be taken in your wallet. · Do not take other medications without consulting your doctor. What to do at Home    Recommended diet: Regular Diet,     Recommended activity: Activity as tolerated,      Follow-up with Dr Holly Nguyen in 2 weeks        Information obtained by :  I understand that if any problems occur once I am at home I am to contact my physician. I understand and acknowledge receipt of the instructions indicated above.                                                                                                                                            Physician's or R.N.'s Signature                                                                  Date/Time                                                                                                                                              Patient or Representative Signature                                                          Date/Time

## 2017-12-22 NOTE — OP NOTES
[address] REPORT    David Nolen  MR#: [id number]  : 1945  ACCOUNT #: [de-identified]   DATE OF SERVICE: 2017    PREOPERATIVE DIAGNOSIS:  Abdominal aortic aneurysm. POSTOPERATIVE DIAGNOSIS: Abdominal aortic aneurysm. PROCEDURES PERFORMED  1. Endovascular repair of abdominal aortic aneurysm using modular bifurcated device. 2.  Bilateral nonselective catheterization of the aorta. 3.  Bilateral exposure of common femoral arteries. SURGEON:  Franchesca Pace MD    ANESTHESIA:  General anesthesia. COMPLICATIONS:  None. ESTIMATED BLOOD LOSS:  100 mL. SPECIMENS REMOVED:  None. INDICATION FOR PROCEDURE:  The patient is a middle-aged male with an aortic aneurysm. The decision was made to take him to the operating room for repair. TECHNICAL DETAILS:  Patient was taken to the operating room, and suitable level of anesthesia was induced; he was prepped and draped in the typical sterile fashion. Oblique incisions were made in both groins. Dissection was carried out down to the common femoral artery, which was dissected free of the soft tissue attachments. Sheaths were placed and pigtail advanced into the aorta. An angiogram was performed, localizing bilateral renal vessels. The device was then deployed, just inferior to the lower left renal artery. Contralateral gate was cannulated, and the docking prosthesis placed; main body prosthesis was subsequently deployed in the usual fashion. Balloon angioplasty was performed of all of the attachment points. Completion angiography showed a delayed type 2 endoleak of a mild nature without evidence of any complication. Vessels were closed with interrupted suture, and wounds irrigated thoroughly and closed in multiple layers. He tolerated the procedures well. Sponge and instrument counts were correct times 2.       He was awakened and transferred to recovery room in good condition.       MD YASMIN Martinez / TRINI  D: 12/21/2017 14:42     T: 12/22/2017 01:32  JOB #: [id number]

## 2017-12-22 NOTE — PROGRESS NOTES
Shift Summary    0710:  Patient resting quietly in bed. He is on room air. No signs of distress noted. Rojas draining to gravity. Call bell within reach. 0900: Rojas removed per orders. 0930:  A-line oozing blood. Patient adamant that the line come out. A-Line removed. 1000:  Dr. Harmony Pan at the bedside assessing patient. If patient able to urinate and walk, he may be discharged. 1045:  Patient able to void post rojas removal.  He was able to get out of bed and walk without difficulty. 1115:  Patient discharge home. Wife here to pick him up.

## 2017-12-22 NOTE — ROUTINE PROCESS
12/22/17   11:21AM  Post-op appt scheduled with Dr. Buck Boast on 1/5/18 at 10:30AM. Appt added to AVS. Rohan Coats CM Specialist

## 2017-12-22 NOTE — ROUTINE PROCESS
@4583 Bedside and Verbal shift change report given to Yee Nicole RN (oncoming nurse) by Florence Villarreal RN (offgoing nurse). Report included the following information SBAR, Kardex, ED Summary, Procedure Summary, Intake/Output, MAR, Accordion, Recent Results, Med Rec Status, Cardiac Rhythm Sinus and Alarm Parameters . @3834 Bedside and Verbal shift change report given to Long Hendrix RN (oncoming nurse) by Elka Park Nicole RN (offgoing nurse). Report included the following information SBAR, Kardex, ED Summary, Procedure Summary, Intake/Output, MAR, Accordion, Recent Results, Med Rec Status, Cardiac Rhythm Sinus and Alarm Parameters .

## 2018-01-16 ENCOUNTER — HOSPITAL ENCOUNTER (OUTPATIENT)
Dept: GENERAL RADIOLOGY | Age: 73
Discharge: HOME | End: 2018-01-16
Attending: INTERNAL MEDICINE
Payer: MEDICARE

## 2018-01-16 DIAGNOSIS — M54.5 LOW BACK PAIN, UNSPECIFIED BACK PAIN LATERALITY, UNSPECIFIED CHRONICITY, WITH SCIATICA PRESENCE UNSPECIFIED: ICD-10-CM

## 2018-01-16 PROBLEM — R41.3 SHORT-TERM MEMORY LOSS: Status: ACTIVE | Noted: 2018-01-16

## 2018-01-16 PROCEDURE — 72100 X-RAY EXAM L-S SPINE 2/3 VWS: CPT

## 2018-05-29 PROBLEM — E78.00 HYPERCHOLESTEREMIA: Status: ACTIVE | Noted: 2018-05-29

## 2021-08-11 ENCOUNTER — ANESTHESIA EVENT (OUTPATIENT)
Dept: ENDOSCOPY | Age: 76
End: 2021-08-11
Payer: MEDICARE

## 2021-08-11 ENCOUNTER — HOSPITAL ENCOUNTER (OUTPATIENT)
Age: 76
Setting detail: OUTPATIENT SURGERY
Discharge: HOME | End: 2021-08-11
Attending: INTERNAL MEDICINE | Admitting: INTERNAL MEDICINE
Payer: MEDICARE

## 2021-08-11 ENCOUNTER — ANESTHESIA (OUTPATIENT)
Dept: ENDOSCOPY | Age: 76
End: 2021-08-11
Payer: MEDICARE

## 2021-08-11 VITALS
SYSTOLIC BLOOD PRESSURE: 116 MMHG | OXYGEN SATURATION: 96 % | BODY MASS INDEX: 29.8 KG/M2 | HEART RATE: 64 BPM | WEIGHT: 220 LBS | DIASTOLIC BLOOD PRESSURE: 85 MMHG | TEMPERATURE: 98.5 F | HEIGHT: 72 IN | RESPIRATION RATE: 18 BRPM

## 2021-08-11 PROCEDURE — 77030009426 HC FCPS BIOP ENDOSC BSC -B: Performed by: INTERNAL MEDICINE

## 2021-08-11 PROCEDURE — 76060000031 HC ANESTHESIA FIRST 0.5 HR: Performed by: INTERNAL MEDICINE

## 2021-08-11 PROCEDURE — 88305 TISSUE EXAM BY PATHOLOGIST: CPT

## 2021-08-11 PROCEDURE — 2709999900 HC NON-CHARGEABLE SUPPLY: Performed by: INTERNAL MEDICINE

## 2021-08-11 PROCEDURE — 74011000250 HC RX REV CODE- 250: Performed by: STUDENT IN AN ORGANIZED HEALTH CARE EDUCATION/TRAINING PROGRAM

## 2021-08-11 PROCEDURE — 77030021593 HC FCPS BIOP ENDOSC BSC -A: Performed by: INTERNAL MEDICINE

## 2021-08-11 PROCEDURE — 74011250636 HC RX REV CODE- 250/636: Performed by: STUDENT IN AN ORGANIZED HEALTH CARE EDUCATION/TRAINING PROGRAM

## 2021-08-11 PROCEDURE — 76040000019: Performed by: INTERNAL MEDICINE

## 2021-08-11 RX ORDER — SODIUM CHLORIDE 9 MG/ML
50 INJECTION, SOLUTION INTRAVENOUS CONTINUOUS
Status: DISCONTINUED | OUTPATIENT
Start: 2021-08-11 | End: 2021-08-11 | Stop reason: HOSPADM

## 2021-08-11 RX ORDER — ATROPINE SULFATE 0.1 MG/ML
0.5 INJECTION INTRAVENOUS
Status: DISCONTINUED | OUTPATIENT
Start: 2021-08-11 | End: 2021-08-11 | Stop reason: HOSPADM

## 2021-08-11 RX ORDER — EPINEPHRINE 0.1 MG/ML
1 INJECTION INTRACARDIAC; INTRAVENOUS
Status: DISCONTINUED | OUTPATIENT
Start: 2021-08-11 | End: 2021-08-11 | Stop reason: HOSPADM

## 2021-08-11 RX ORDER — PROPOFOL 10 MG/ML
INJECTION, EMULSION INTRAVENOUS AS NEEDED
Status: DISCONTINUED | OUTPATIENT
Start: 2021-08-11 | End: 2021-08-11 | Stop reason: HOSPADM

## 2021-08-11 RX ORDER — FLUMAZENIL 0.1 MG/ML
0.2 INJECTION INTRAVENOUS
Status: DISCONTINUED | OUTPATIENT
Start: 2021-08-11 | End: 2021-08-11 | Stop reason: HOSPADM

## 2021-08-11 RX ORDER — FENTANYL CITRATE 50 UG/ML
25-200 INJECTION, SOLUTION INTRAMUSCULAR; INTRAVENOUS
Status: DISCONTINUED | OUTPATIENT
Start: 2021-08-11 | End: 2021-08-11 | Stop reason: HOSPADM

## 2021-08-11 RX ORDER — LIDOCAINE HYDROCHLORIDE 20 MG/ML
INJECTION, SOLUTION EPIDURAL; INFILTRATION; INTRACAUDAL; PERINEURAL AS NEEDED
Status: DISCONTINUED | OUTPATIENT
Start: 2021-08-11 | End: 2021-08-11 | Stop reason: HOSPADM

## 2021-08-11 RX ORDER — SODIUM CHLORIDE 0.9 % (FLUSH) 0.9 %
5-40 SYRINGE (ML) INJECTION EVERY 8 HOURS
Status: DISCONTINUED | OUTPATIENT
Start: 2021-08-11 | End: 2021-08-11 | Stop reason: HOSPADM

## 2021-08-11 RX ORDER — PHENYLEPHRINE HCL IN 0.9% NACL 0.4MG/10ML
SYRINGE (ML) INTRAVENOUS AS NEEDED
Status: DISCONTINUED | OUTPATIENT
Start: 2021-08-11 | End: 2021-08-11 | Stop reason: HOSPADM

## 2021-08-11 RX ORDER — DEXTROMETHORPHAN/PSEUDOEPHED 2.5-7.5/.8
1.2 DROPS ORAL
Status: DISCONTINUED | OUTPATIENT
Start: 2021-08-11 | End: 2021-08-11 | Stop reason: HOSPADM

## 2021-08-11 RX ORDER — MIDAZOLAM HYDROCHLORIDE 1 MG/ML
.25-5 INJECTION, SOLUTION INTRAMUSCULAR; INTRAVENOUS
Status: DISCONTINUED | OUTPATIENT
Start: 2021-08-11 | End: 2021-08-11 | Stop reason: HOSPADM

## 2021-08-11 RX ORDER — SODIUM CHLORIDE 0.9 % (FLUSH) 0.9 %
5-40 SYRINGE (ML) INJECTION AS NEEDED
Status: DISCONTINUED | OUTPATIENT
Start: 2021-08-11 | End: 2021-08-11 | Stop reason: HOSPADM

## 2021-08-11 RX ORDER — SODIUM CHLORIDE, SODIUM LACTATE, POTASSIUM CHLORIDE, CALCIUM CHLORIDE 600; 310; 30; 20 MG/100ML; MG/100ML; MG/100ML; MG/100ML
INJECTION, SOLUTION INTRAVENOUS
Status: DISCONTINUED | OUTPATIENT
Start: 2021-08-11 | End: 2021-08-11 | Stop reason: HOSPADM

## 2021-08-11 RX ORDER — NALOXONE HYDROCHLORIDE 0.4 MG/ML
0.4 INJECTION, SOLUTION INTRAMUSCULAR; INTRAVENOUS; SUBCUTANEOUS
Status: DISCONTINUED | OUTPATIENT
Start: 2021-08-11 | End: 2021-08-11 | Stop reason: HOSPADM

## 2021-08-11 RX ADMIN — LIDOCAINE HYDROCHLORIDE 60 MG: 20 INJECTION, SOLUTION EPIDURAL; INFILTRATION; INTRACAUDAL; PERINEURAL at 15:26

## 2021-08-11 RX ADMIN — PROPOFOL 50 MG: 10 INJECTION, EMULSION INTRAVENOUS at 15:34

## 2021-08-11 RX ADMIN — Medication 80 MCG: at 15:37

## 2021-08-11 RX ADMIN — PROPOFOL 50 MG: 10 INJECTION, EMULSION INTRAVENOUS at 15:29

## 2021-08-11 RX ADMIN — SODIUM CHLORIDE, POTASSIUM CHLORIDE, SODIUM LACTATE AND CALCIUM CHLORIDE: 600; 310; 30; 20 INJECTION, SOLUTION INTRAVENOUS at 15:22

## 2021-08-11 RX ADMIN — PROPOFOL 70 MG: 10 INJECTION, EMULSION INTRAVENOUS at 15:26

## 2021-08-11 NOTE — ANESTHESIA PREPROCEDURE EVALUATION
Anesthetic History   No history of anesthetic complications            Review of Systems / Medical History  Patient summary reviewed, nursing notes reviewed and pertinent labs reviewed    Pulmonary    COPD      Smoker         Neuro/Psych             Comments: able to coil & placed  shunt Cardiovascular              PAD (AAA, carotid stenosis, cerebral aneurysm s/p coiling) and hyperlipidemia    Exercise tolerance: >4 METS  Comments: AAA (abdominal aortic aneurysm) without rupture (HCC) (I71.4)   GI/Hepatic/Renal  Within defined limits              Endo/Other        Obesity     Other Findings              Physical Exam    Airway  Mallampati: III  TM Distance: 4 - 6 cm  Neck ROM: normal range of motion   Mouth opening: Normal     Cardiovascular    Rhythm: regular  Rate: normal         Dental    Dentition: Full upper dentures and Full lower dentures     Pulmonary  Breath sounds clear to auscultation               Abdominal  GI exam deferred       Other Findings            Anesthetic Plan    ASA: 3  Anesthesia type: MAC          Induction: Intravenous  Anesthetic plan and risks discussed with: Patient

## 2021-08-11 NOTE — ANESTHESIA POSTPROCEDURE EVALUATION
Procedure(s):  COLONOSCOPY  COLON BIOPSY  ENDOSCOPIC POLYPECTOMY. MAC    Anesthesia Post Evaluation        Patient location during evaluation: PACU  Patient participation: complete - patient participated  Level of consciousness: awake  Pain management: adequate  Airway patency: patent  Anesthetic complications: no  Cardiovascular status: acceptable  Respiratory status: acceptable  Hydration status: acceptable  Post anesthesia nausea and vomiting:  none  Final Post Anesthesia Temperature Assessment:  Normothermia (36.0-37.5 degrees C)      INITIAL Post-op Vital signs:   Vitals Value Taken Time   /89 08/11/21 1605   Temp     Pulse 66 08/11/21 1606   Resp 18 08/11/21 1606   SpO2 94 % 08/11/21 1606   Vitals shown include unvalidated device data. Analgesia was given

## 2021-08-11 NOTE — PROCEDURES
[address]  [address] [facility], [address]      Colonoscopy Operative Report    Radu Wagner  [id number]  1945      Procedure Type:   Colonoscopy with biopsy     Indications:    Diarrhea   S/p negative stool tests    Pre-operative Diagnosis: see indication above    Post-operative Diagnosis:  See findings below    :  Karis Miller MD    Surgical Assistant: Endoscopy Technician-1: Kerry Bah  Endoscopy RN-1: Oscar Pressley RN    Implants:  None    Referring Provider: Lashay Amaya MD      Sedation:  MAC anesthesia Propofol      Procedure Details:  After informed consent was obtained with all risks and benefits of procedure explained and preoperative exam completed, the patient was taken to the endoscopy suite and placed in the left lateral decubitus position. Upon sequential sedation as per above, a digital rectal exam was performed demonstrating internal hemorrhoids. The Olympus videocolonoscope  was inserted in the rectum and carefully advanced to the cecum, which was identified by the ileocecal valve and appendiceal orifice, terminal ileum. The cecum was identified by the ileocecal valve and appendiceal orifice. The quality of preparation was good. The colonoscope was slowly withdrawn with careful evaluation between folds. Retroflexion in the rectum was completed . Findings:   Rectum: normal  1 cm polyp in distal rectum, removed by hot biopsy  Sigmoid: normal  Moderate diverticulosis  Descending Colon: normal  Transverse Colon: normal  Ascending Colon: normal  Cecum: normal  Terminal Ileum: normal    Random colonic biopsies taken       Specimen Removed:  as above    Complications: None. EBL:  None. Impression:    see findings    Recommendations: --Await pathology.       Recommendation for next colonscopy in 3 versus 5  Years  F/u in 1 month  Signed By: Karis Miller MD     8/11/2021  3:45 PM

## 2021-08-11 NOTE — DISCHARGE INSTRUCTIONS
[facility]    COLON DISCHARGE INSTRUCTIONS    José Miguel Salas  [id number]  1945    Discomfort:  Redness at IV site- apply warm compress to area; if redness or soreness persist- contact your physician  There may be a slight amount of blood passed from the rectum  Gaseous discomfort- walking, belching will help relieve any discomfort  You may not operate a vehicle for 12 hours  You may not engage in an occupation involving machinery or appliances for rest of today  You may not drink alcoholic beverages for at least 12 hours  Avoid making any critical decisions for at least 24 hour  DIET:  You may resume your regular diet - however -  remember your colon is empty and a heavy meal will produce gas. Avoid these foods:  vegetables, fried / greasy foods, carbonated drinks     ACTIVITY:  You may  resume your normal daily activities it is recommended that you spend the remainder of the day resting -  avoid any strenuous activity. CALL M.D. ANY SIGN OF:   Increasing pain, nausea, vomiting  Abdominal distension (swelling)  New increased bleeding (oral or rectal)  Fever (chills)  Pain in chest area  Bloody discharge from nose or mouth  Shortness of breath      Follow-up Instructions:   Call Dr. Vi Perales for any questions or problems at [phone] and follow up in 1 month          ENDOSCOPY FINDINGS:   Your colonoscopy showed one small polyp removed and diverticulosis, I also biopsies to look for any inflammation.   Telephone # [phone]      Signed By: Vi Perales MD     8/11/2021  3:49 PM       DISCHARGE SUMMARY from Nurse    The following personal items collected during your admission are returned to you:   Dental Appliance:    Vision: Visual Aid: Glasses  Hearing Aid:    Jewelry:    Clothing:    Other Valuables:    Valuables sent to safe:        Learning About Coronavirus (COVID-19)  Coronavirus [phone]): Overview  What is coronavirus (IQBRH-18)? The coronavirus disease (COVID-19) is caused by a virus. It is an illness that was first found in [place], [place], in December 2019. It has since spread worldwide. The virus can cause fever, cough, and trouble breathing. In severe cases, it can cause pneumonia and make it hard to breathe without help. It can cause death. Coronaviruses are a large group of viruses. They cause the common cold. They also cause more serious illnesses like Middle East respiratory syndrome (MERS) and severe acute respiratory syndrome (SARS). COVID-19 is caused by a novel coronavirus. That means it's a new type that has not been seen in people before. This virus spreads person-to-person through droplets from coughing and sneezing. It can also spread when you are close to someone who is infected. And it can spread when you touch something that has the virus on it, such as a doorknob or a tabletop. What can you do to protect yourself from coronavirus (COVID-19)? The best way to protect yourself from getting sick is to:  · Avoid areas where there is an outbreak. · Avoid contact with people who may be infected. · Wash your hands often with soap or alcohol-based hand sanitizers. · Avoid crowds and try to stay at least 6 feet away from other people. · Wash your hands often, especially after you cough or sneeze. Use soap and water, and scrub for at least 20 seconds. If soap and water aren't available, use an alcohol-based hand . · Avoid touching your mouth, nose, and eyes. What can you do to avoid spreading the virus to others? To help avoid spreading the virus to others:  · Cover your mouth with a tissue when you cough or sneeze. Then throw the tissue in the trash. · Use a disinfectant to clean things that you touch often. · Stay home if you are sick or have been exposed to the virus. Don't go to school, work, or public areas. And don't use public transportation.   · If you are sick:  ? Leave your home only if you need to get medical care. But call the doctor's office first so they know you're coming. And wear a face mask, if you have one.  ? If you have a face mask, wear it whenever you're around other people. It can help stop the spread of the virus when you cough or sneeze. ? Clean and disinfect your home every day. Use household  and disinfectant wipes or sprays. Take special care to clean things that you grab with your hands. These include doorknobs, remote controls, phones, and handles on your refrigerator and microwave. And don't forget countertops, tabletops, bathrooms, and computer keyboards. When to call for help  Call 911 anytime you think you may need emergency care. For example, call if:  · You have severe trouble breathing. (You can't talk at all.)  · You have constant chest pain or pressure. · You are severely dizzy or lightheaded. · You are confused or can't think clearly. · Your face and lips have a blue color. · You pass out (lose consciousness) or are very hard to wake up. Call your doctor now if you develop symptoms such as:  · Shortness of breath. · Fever. · Cough. If you need to get care, call ahead to the doctor's office for instructions before you go. Make sure you wear a face mask, if you have one, to prevent exposing other people to the virus. Where can you get the latest information? The following health organizations are tracking and studying this virus. Their websites contain the most up-to-date information. Edgardo Garcia also learn what to do if you think you may have been exposed to the virus. · U.S. Centers for Disease Control and Prevention (CDC): The CDC provides updated news about the disease and travel advice. The website also tells you how to prevent the spread of infection. www.cdc.gov  · World Health Organization [facility]): WHO offers information about the virus outbreaks.  WHO also has travel advice. www.who.int  Current as of: April 1, 2020               Content Version: 12.4  © 4839-4836 Healthwise, Incorporated. Care instructions adapted under license by your healthcare professional. If you have questions about a medical condition or this instruction, always ask your healthcare professional. [address] any warranty or liability for your use of this information.

## 2022-03-18 PROBLEM — I71.40 ABDOMINAL AORTIC ANEURYSM (AAA) WITHOUT RUPTURE (HCC): Status: ACTIVE | Noted: 2017-05-24

## 2022-03-19 PROBLEM — I71.40 AAA (ABDOMINAL AORTIC ANEURYSM) (HCC): Status: ACTIVE | Noted: 2017-12-21

## 2022-03-20 PROBLEM — E78.00 HYPERCHOLESTEREMIA: Status: ACTIVE | Noted: 2018-05-29

## 2022-03-20 PROBLEM — R41.3 SHORT-TERM MEMORY LOSS: Status: ACTIVE | Noted: 2018-01-16

## 2023-01-01 ENCOUNTER — HOSPICE ADMISSION (OUTPATIENT)
Age: 78
End: 2023-01-01
Payer: MEDICARE

## 2023-01-01 ENCOUNTER — HOME CARE VISIT (OUTPATIENT)
Facility: HOME HEALTH | Age: 78
End: 2023-01-01
Payer: MEDICARE

## 2023-01-01 ENCOUNTER — HOME CARE VISIT (OUTPATIENT)
Age: 78
End: 2023-01-01
Payer: MEDICARE

## 2023-01-01 VITALS
RESPIRATION RATE: 16 BRPM | SYSTOLIC BLOOD PRESSURE: 110 MMHG | HEART RATE: 88 BPM | OXYGEN SATURATION: 92 % | DIASTOLIC BLOOD PRESSURE: 70 MMHG

## 2023-01-01 VITALS
SYSTOLIC BLOOD PRESSURE: 146 MMHG | HEART RATE: 78 BPM | TEMPERATURE: 98.3 F | OXYGEN SATURATION: 91 % | RESPIRATION RATE: 18 BRPM | DIASTOLIC BLOOD PRESSURE: 74 MMHG

## 2023-01-01 VITALS
SYSTOLIC BLOOD PRESSURE: 110 MMHG | OXYGEN SATURATION: 86 % | HEART RATE: 106 BPM | RESPIRATION RATE: 14 BRPM | TEMPERATURE: 98.5 F | DIASTOLIC BLOOD PRESSURE: 66 MMHG

## 2023-01-01 VITALS — RESPIRATION RATE: 14 BRPM | HEART RATE: 100 BPM | TEMPERATURE: 97.4 F

## 2023-01-01 PROCEDURE — G0299 HHS/HOSPICE OF RN EA 15 MIN: HCPCS

## 2023-01-01 PROCEDURE — 0651 HSPC ROUTINE HOME CARE

## 2023-01-01 PROCEDURE — 2500000001 HSPC NON INJECTABLE MED

## 2023-01-01 PROCEDURE — G0300 HHS/HOSPICE OF LPN EA 15 MIN: HCPCS

## 2023-01-01 PROCEDURE — 3331090004 HSPC SERVICE INTENSITY ADD-ON

## 2023-01-01 RX ORDER — BISACODYL 10 MG
10 SUPPOSITORY, RECTAL RECTAL DAILY PRN
COMMUNITY
Start: 2023-01-01 | End: 2023-01-01

## 2023-01-01 RX ORDER — HALOPERIDOL 2 MG/ML
1 SOLUTION ORAL EVERY 4 HOURS PRN
COMMUNITY
Start: 2023-01-01 | End: 2023-01-01

## 2023-01-01 RX ORDER — SCOLOPAMINE TRANSDERMAL SYSTEM 1 MG/1
1 PATCH, EXTENDED RELEASE TRANSDERMAL
COMMUNITY
Start: 2023-01-01 | End: 2023-01-01

## 2023-01-01 RX ORDER — MORPHINE SULFATE 100 MG/5ML
10 SOLUTION ORAL
COMMUNITY
Start: 2023-01-01 | End: 2023-01-01

## 2023-01-01 RX ORDER — ACETAMINOPHEN 650 MG/1
650 SUPPOSITORY RECTAL EVERY 6 HOURS PRN
COMMUNITY
Start: 2023-01-01 | End: 2023-01-01

## 2023-01-01 RX ORDER — HYOSCYAMINE SULFATE 0.12 MG/1
0.12 TABLET SUBLINGUAL EVERY 4 HOURS PRN
COMMUNITY
Start: 2023-01-01 | End: 2023-01-01

## 2023-01-01 RX ORDER — LORAZEPAM 2 MG/ML
1 CONCENTRATE ORAL
COMMUNITY
Start: 2023-01-01 | End: 2023-01-01

## 2023-01-01 RX ADMIN — SCOLOPAMINE TRANSDERMAL SYSTEM 1 PATCH: 1 PATCH, EXTENDED RELEASE TRANSDERMAL at 15:00

## 2023-01-01 RX ADMIN — MORPHINE SULFATE 10 MG: 20 SOLUTION ORAL at 14:00

## 2023-01-01 RX ADMIN — SCOLOPAMINE TRANSDERMAL SYSTEM 1 PATCH: 1 PATCH, EXTENDED RELEASE TRANSDERMAL at 12:40

## 2023-01-01 RX ADMIN — MORPHINE SULFATE 10 MG: 20 SOLUTION ORAL at 12:00

## 2023-01-01 RX ADMIN — LORAZEPAM 1 MG: 2 CONCENTRATE ORAL at 14:00

## 2023-01-01 ASSESSMENT — ENCOUNTER SYMPTOMS
COUGH: 1
SPUTUM PRODUCTION: 1
SPUTUM PRODUCTION: 1
COUGH CHARACTERISTICS: MOIST
SPUTUM COLOR: WHITE
SPUTUM CONSISTENCY: THICK
COUGH CHARACTERISTICS: MOIST
SPUTUM AMOUNT: MODERATE
COUGH: 1

## 2023-04-16 ENCOUNTER — APPOINTMENT (OUTPATIENT)
Dept: CT IMAGING | Age: 78
DRG: 023 | End: 2023-04-16
Attending: STUDENT IN AN ORGANIZED HEALTH CARE EDUCATION/TRAINING PROGRAM
Payer: MEDICARE

## 2023-04-16 ENCOUNTER — HOSPITAL ENCOUNTER (INPATIENT)
Age: 78
LOS: 9 days | Discharge: HOSPICE | DRG: 023 | End: 2023-04-25
Attending: STUDENT IN AN ORGANIZED HEALTH CARE EDUCATION/TRAINING PROGRAM | Admitting: ANESTHESIOLOGY
Payer: MEDICARE

## 2023-04-16 ENCOUNTER — APPOINTMENT (OUTPATIENT)
Dept: INTERVENTIONAL RADIOLOGY/VASCULAR | Age: 78
DRG: 023 | End: 2023-04-16
Attending: PSYCHIATRY & NEUROLOGY
Payer: MEDICARE

## 2023-04-16 ENCOUNTER — APPOINTMENT (OUTPATIENT)
Dept: GENERAL RADIOLOGY | Age: 78
DRG: 023 | End: 2023-04-16
Attending: NURSE PRACTITIONER
Payer: MEDICARE

## 2023-04-16 ENCOUNTER — APPOINTMENT (OUTPATIENT)
Dept: CT IMAGING | Age: 78
DRG: 023 | End: 2023-04-16
Attending: NURSE PRACTITIONER
Payer: MEDICARE

## 2023-04-16 DIAGNOSIS — Z86.79 HISTORY OF CEREBRAL ANEURYSM REPAIR: ICD-10-CM

## 2023-04-16 DIAGNOSIS — Z98.890 HISTORY OF CEREBRAL ANEURYSM REPAIR: ICD-10-CM

## 2023-04-16 DIAGNOSIS — E78.00 HYPERCHOLESTEREMIA: ICD-10-CM

## 2023-04-16 DIAGNOSIS — R53.81 DEBILITY: ICD-10-CM

## 2023-04-16 DIAGNOSIS — Z51.5 PALLIATIVE CARE ENCOUNTER: ICD-10-CM

## 2023-04-16 DIAGNOSIS — I63.232 CEREBROVASCULAR ACCIDENT (CVA) DUE TO OCCLUSION OF LEFT CAROTID ARTERY (HCC): Primary | ICD-10-CM

## 2023-04-16 DIAGNOSIS — R47.01 APHASIA: ICD-10-CM

## 2023-04-16 DIAGNOSIS — R13.10 DYSPHAGIA, UNSPECIFIED TYPE: ICD-10-CM

## 2023-04-16 PROBLEM — I63.9 STROKE (CEREBRUM) (HCC): Status: ACTIVE | Noted: 2023-04-16

## 2023-04-16 PROBLEM — I63.9 STROKE (CEREBRUM) (HCC): Status: ACTIVE | Noted: 2023-01-01

## 2023-04-16 LAB
ALBUMIN SERPL-MCNC: 3.3 G/DL (ref 3.5–5)
ALBUMIN/GLOB SERPL: 0.9 (ref 1.1–2.2)
ALP SERPL-CCNC: 59 U/L (ref 45–117)
ALT SERPL-CCNC: 18 U/L (ref 12–78)
ANION GAP SERPL CALC-SCNC: 1 MMOL/L (ref 5–15)
AST SERPL-CCNC: 24 U/L (ref 15–37)
BASOPHILS # BLD: 0.1 K/UL (ref 0–0.1)
BASOPHILS NFR BLD: 1 % (ref 0–1)
BILIRUB SERPL-MCNC: 0.5 MG/DL (ref 0.2–1)
BUN SERPL-MCNC: 22 MG/DL (ref 6–20)
BUN/CREAT SERPL: 17 (ref 12–20)
CALCIUM SERPL-MCNC: 9.3 MG/DL (ref 8.5–10.1)
CHLORIDE SERPL-SCNC: 109 MMOL/L (ref 97–108)
CO2 SERPL-SCNC: 28 MMOL/L (ref 21–32)
COMMENT, HOLDF: NORMAL
CREAT SERPL-MCNC: 1.27 MG/DL (ref 0.7–1.3)
DIFFERENTIAL METHOD BLD: ABNORMAL
EOSINOPHIL # BLD: 0.1 K/UL (ref 0–0.4)
EOSINOPHIL NFR BLD: 1 % (ref 0–7)
ERYTHROCYTE [DISTWIDTH] IN BLOOD BY AUTOMATED COUNT: 14.2 % (ref 11.5–14.5)
GLOBULIN SER CALC-MCNC: 3.6 G/DL (ref 2–4)
GLUCOSE SERPL-MCNC: 127 MG/DL (ref 65–100)
HCT VFR BLD AUTO: 40.5 % (ref 36.6–50.3)
HGB BLD-MCNC: 13.2 G/DL (ref 12.1–17)
IMM GRANULOCYTES # BLD AUTO: 0 K/UL (ref 0–0.04)
IMM GRANULOCYTES NFR BLD AUTO: 1 % (ref 0–0.5)
INR PPP: 1 (ref 0.9–1.1)
LYMPHOCYTES # BLD: 2.1 K/UL (ref 0.8–3.5)
LYMPHOCYTES NFR BLD: 24 % (ref 12–49)
MCH RBC QN AUTO: 31.7 PG (ref 26–34)
MCHC RBC AUTO-ENTMCNC: 32.6 G/DL (ref 30–36.5)
MCV RBC AUTO: 97.1 FL (ref 80–99)
MONOCYTES # BLD: 1.2 K/UL (ref 0–1)
MONOCYTES NFR BLD: 14 % (ref 5–13)
NEUTS SEG # BLD: 5.2 K/UL (ref 1.8–8)
NEUTS SEG NFR BLD: 59 % (ref 32–75)
NRBC # BLD: 0 K/UL (ref 0–0.01)
NRBC BLD-RTO: 0 PER 100 WBC
PLATELET # BLD AUTO: 238 K/UL (ref 150–400)
PMV BLD AUTO: 10.8 FL (ref 8.9–12.9)
POTASSIUM SERPL-SCNC: 4.7 MMOL/L (ref 3.5–5.1)
PROT SERPL-MCNC: 6.9 G/DL (ref 6.4–8.2)
PROTHROMBIN TIME: 10.9 SEC (ref 9–11.1)
RBC # BLD AUTO: 4.17 M/UL (ref 4.1–5.7)
SAMPLES BEING HELD,HOLD: NORMAL
SODIUM SERPL-SCNC: 138 MMOL/L (ref 136–145)
TROPONIN I SERPL HS-MCNC: <3 NG/L (ref 0–57)
WBC # BLD AUTO: 8.7 K/UL (ref 4.1–11.1)

## 2023-04-16 PROCEDURE — C1725 CATH, TRANSLUMIN NON-LASER: HCPCS

## 2023-04-16 PROCEDURE — 74011250637 HC RX REV CODE- 250/637: Performed by: PSYCHIATRY & NEUROLOGY

## 2023-04-16 PROCEDURE — C1887 CATHETER, GUIDING: HCPCS

## 2023-04-16 PROCEDURE — 037L3DZ DILATION OF LEFT INTERNAL CAROTID ARTERY WITH INTRALUMINAL DEVICE, PERCUTANEOUS APPROACH: ICD-10-PCS | Performed by: PSYCHIATRY & NEUROLOGY

## 2023-04-16 PROCEDURE — 2709999900 HC NON-CHARGEABLE SUPPLY

## 2023-04-16 PROCEDURE — 77030008584 HC TOOL GDWRE DEV TERU -A

## 2023-04-16 PROCEDURE — 99285 EMERGENCY DEPT VISIT HI MDM: CPT

## 2023-04-16 PROCEDURE — 61645 PERQ ART M-THROMBECT &/NFS: CPT | Performed by: PSYCHIATRY & NEUROLOGY

## 2023-04-16 PROCEDURE — C1769 GUIDE WIRE: HCPCS

## 2023-04-16 PROCEDURE — C1876 STENT, NON-COA/NON-COV W/DEL: HCPCS

## 2023-04-16 PROCEDURE — 77030012468 HC VLV BLEEDBK CNTRL ABBT -B

## 2023-04-16 PROCEDURE — 65610000006 HC RM INTENSIVE CARE

## 2023-04-16 PROCEDURE — C1760 CLOSURE DEV, VASC: HCPCS

## 2023-04-16 PROCEDURE — 4A03X5D MEASUREMENT OF ARTERIAL FLOW, INTRACRANIAL, EXTERNAL APPROACH: ICD-10-PCS | Performed by: STUDENT IN AN ORGANIZED HEALTH CARE EDUCATION/TRAINING PROGRAM

## 2023-04-16 PROCEDURE — 74011250636 HC RX REV CODE- 250/636: Performed by: PSYCHIATRY & NEUROLOGY

## 2023-04-16 PROCEDURE — 74011000250 HC RX REV CODE- 250: Performed by: PSYCHIATRY & NEUROLOGY

## 2023-04-16 PROCEDURE — APPNB30 APP NON BILLABLE TIME 0-30 MINS

## 2023-04-16 PROCEDURE — 74011000636 HC RX REV CODE- 636: Performed by: RADIOLOGY

## 2023-04-16 PROCEDURE — 85610 PROTHROMBIN TIME: CPT

## 2023-04-16 PROCEDURE — 74011000636 HC RX REV CODE- 636: Performed by: PSYCHIATRY & NEUROLOGY

## 2023-04-16 PROCEDURE — 76060000034 HC ANESTHESIA 1.5 TO 2 HR

## 2023-04-16 PROCEDURE — 70450 CT HEAD/BRAIN W/O DYE: CPT

## 2023-04-16 PROCEDURE — 84484 ASSAY OF TROPONIN QUANT: CPT

## 2023-04-16 PROCEDURE — 61645 PERQ ART M-THROMBECT &/NFS: CPT

## 2023-04-16 PROCEDURE — 37216 TRANSCATH STENT CCA W/O EPS: CPT | Performed by: PSYCHIATRY & NEUROLOGY

## 2023-04-16 PROCEDURE — 93005 ELECTROCARDIOGRAM TRACING: CPT

## 2023-04-16 PROCEDURE — 77030025703 HC SYR ANGI VACLOK MRTM -A

## 2023-04-16 PROCEDURE — B31R1ZZ FLUOROSCOPY OF INTRACRANIAL ARTERIES USING LOW OSMOLAR CONTRAST: ICD-10-PCS | Performed by: PSYCHIATRY & NEUROLOGY

## 2023-04-16 PROCEDURE — 03CL3ZZ EXTIRPATION OF MATTER FROM LEFT INTERNAL CAROTID ARTERY, PERCUTANEOUS APPROACH: ICD-10-PCS | Performed by: PSYCHIATRY & NEUROLOGY

## 2023-04-16 PROCEDURE — 0042T CT CODE NEURO PERF W CBF: CPT

## 2023-04-16 PROCEDURE — 36415 COLL VENOUS BLD VENIPUNCTURE: CPT

## 2023-04-16 PROCEDURE — 74011250636 HC RX REV CODE- 250/636: Performed by: STUDENT IN AN ORGANIZED HEALTH CARE EDUCATION/TRAINING PROGRAM

## 2023-04-16 PROCEDURE — C1757 CATH, THROMBECTOMY/EMBOLECT: HCPCS

## 2023-04-16 PROCEDURE — 70496 CT ANGIOGRAPHY HEAD: CPT

## 2023-04-16 PROCEDURE — 74018 RADEX ABDOMEN 1 VIEW: CPT

## 2023-04-16 PROCEDURE — 77030013715 HC INFL SYS MRTM -B

## 2023-04-16 PROCEDURE — 85025 COMPLETE CBC W/AUTO DIFF WBC: CPT

## 2023-04-16 PROCEDURE — 77010033678 HC OXYGEN DAILY

## 2023-04-16 PROCEDURE — 96374 THER/PROPH/DIAG INJ IV PUSH: CPT

## 2023-04-16 PROCEDURE — 80053 COMPREHEN METABOLIC PANEL: CPT

## 2023-04-16 RX ORDER — ASPIRIN 300 MG/1
300 SUPPOSITORY RECTAL DAILY
Status: DISCONTINUED | OUTPATIENT
Start: 2023-04-17 | End: 2023-04-16

## 2023-04-16 RX ORDER — SODIUM CHLORIDE 9 MG/ML
75 INJECTION, SOLUTION INTRAVENOUS CONTINUOUS
Status: DISCONTINUED | OUTPATIENT
Start: 2023-04-16 | End: 2023-04-18

## 2023-04-16 RX ORDER — LEVETIRACETAM 500 MG/5ML
2000 INJECTION, SOLUTION, CONCENTRATE INTRAVENOUS
Status: COMPLETED | OUTPATIENT
Start: 2023-04-16 | End: 2023-04-16

## 2023-04-16 RX ORDER — ATORVASTATIN CALCIUM 40 MG/1
80 TABLET, FILM COATED ORAL
Status: DISCONTINUED | OUTPATIENT
Start: 2023-04-16 | End: 2023-04-23

## 2023-04-16 RX ORDER — CLOPIDOGREL BISULFATE 75 MG/1
75 TABLET ORAL DAILY
Status: DISCONTINUED | OUTPATIENT
Start: 2023-04-17 | End: 2023-04-23

## 2023-04-16 RX ORDER — CLOPIDOGREL 300 MG/1
300 TABLET, FILM COATED ORAL ONCE
Status: COMPLETED | OUTPATIENT
Start: 2023-04-16 | End: 2023-04-16

## 2023-04-16 RX ORDER — ASPIRIN 325 MG
325 TABLET ORAL
Status: COMPLETED | OUTPATIENT
Start: 2023-04-16 | End: 2023-04-16

## 2023-04-16 RX ORDER — LIDOCAINE HYDROCHLORIDE 10 MG/ML
20 INJECTION INFILTRATION; PERINEURAL
Status: COMPLETED | OUTPATIENT
Start: 2023-04-16 | End: 2023-04-16

## 2023-04-16 RX ORDER — GUAIFENESIN 100 MG/5ML
324 LIQUID (ML) ORAL DAILY
Status: DISCONTINUED | OUTPATIENT
Start: 2023-04-17 | End: 2023-04-17

## 2023-04-16 RX ADMIN — SODIUM CHLORIDE 75 ML/HR: 9 INJECTION, SOLUTION INTRAVENOUS at 13:52

## 2023-04-16 RX ADMIN — LEVETIRACETAM 2000 MG: 100 INJECTION, SOLUTION INTRAVENOUS at 10:33

## 2023-04-16 RX ADMIN — CLOPIDOGREL BISULFATE 300 MG: 300 TABLET, FILM COATED ORAL at 11:30

## 2023-04-16 RX ADMIN — ASPIRIN 325 MG ORAL TABLET 325 MG: 325 PILL ORAL at 12:06

## 2023-04-16 RX ADMIN — Medication 4000 UNITS: at 11:06

## 2023-04-16 RX ADMIN — IOPAMIDOL 154 ML: 612 INJECTION, SOLUTION INTRAVENOUS at 11:58

## 2023-04-16 RX ADMIN — Medication 4000 UNITS: at 11:07

## 2023-04-16 RX ADMIN — IOHEXOL 80 ML: 350 INJECTION, SOLUTION INTRAVENOUS at 09:50

## 2023-04-16 RX ADMIN — IOHEXOL 40 ML: 350 INJECTION, SOLUTION INTRAVENOUS at 09:50

## 2023-04-16 RX ADMIN — Medication 4000 UNITS: at 11:02

## 2023-04-16 RX ADMIN — ATORVASTATIN CALCIUM 80 MG: 40 TABLET, FILM COATED ORAL at 21:44

## 2023-04-16 RX ADMIN — LIDOCAINE HYDROCHLORIDE 6 ML: 10 INJECTION, SOLUTION INFILTRATION; PERINEURAL at 11:01

## 2023-04-17 ENCOUNTER — APPOINTMENT (OUTPATIENT)
Dept: CT IMAGING | Age: 78
DRG: 023 | End: 2023-04-17
Attending: PSYCHIATRY & NEUROLOGY
Payer: MEDICARE

## 2023-04-17 ENCOUNTER — APPOINTMENT (OUTPATIENT)
Dept: NON INVASIVE DIAGNOSTICS | Age: 78
DRG: 023 | End: 2023-04-17
Attending: NURSE PRACTITIONER
Payer: MEDICARE

## 2023-04-17 LAB
ANION GAP SERPL CALC-SCNC: 3 MMOL/L (ref 5–15)
ASPIRIN TEST, ASPIRN: 400 ARU
BASOPHILS # BLD: 0 K/UL (ref 0–0.1)
BASOPHILS NFR BLD: 0 % (ref 0–1)
BUN SERPL-MCNC: 20 MG/DL (ref 6–20)
BUN/CREAT SERPL: 18 (ref 12–20)
CALCIUM SERPL-MCNC: 8.8 MG/DL (ref 8.5–10.1)
CHLORIDE SERPL-SCNC: 109 MMOL/L (ref 97–108)
CHOLEST SERPL-MCNC: 197 MG/DL
CO2 SERPL-SCNC: 25 MMOL/L (ref 21–32)
CREAT SERPL-MCNC: 1.09 MG/DL (ref 0.7–1.3)
DIFFERENTIAL METHOD BLD: ABNORMAL
ECHO AO ROOT DIAM: 3.9 CM
ECHO AO ROOT INDEX: 1.82 CM/M2
ECHO AV AREA PEAK VELOCITY: 3.8 CM2
ECHO AV AREA/BSA PEAK VELOCITY: 1.8 CM2/M2
ECHO AV PEAK GRADIENT: 7 MMHG
ECHO AV PEAK VELOCITY: 1.3 M/S
ECHO AV VELOCITY RATIO: 0.85
ECHO EST RA PRESSURE: 3 MMHG
ECHO LA DIAMETER INDEX: 2.52 CM/M2
ECHO LA DIAMETER: 5.4 CM
ECHO LA TO AORTIC ROOT RATIO: 1.38
ECHO LV E' LATERAL VELOCITY: 11 CM/S
ECHO LV E' SEPTAL VELOCITY: 8 CM/S
ECHO LV FRACTIONAL SHORTENING: 36 % (ref 28–44)
ECHO LV INTERNAL DIMENSION DIASTOLE INDEX: 2.1 CM/M2
ECHO LV INTERNAL DIMENSION DIASTOLIC: 4.5 CM (ref 4.2–5.9)
ECHO LV INTERNAL DIMENSION SYSTOLIC INDEX: 1.36 CM/M2
ECHO LV INTERNAL DIMENSION SYSTOLIC: 2.9 CM
ECHO LV IVSD: 1.2 CM (ref 0.6–1)
ECHO LV MASS 2D: 186.4 G (ref 88–224)
ECHO LV MASS INDEX 2D: 87.1 G/M2 (ref 49–115)
ECHO LV POSTERIOR WALL DIASTOLIC: 1.1 CM (ref 0.6–1)
ECHO LV RELATIVE WALL THICKNESS RATIO: 0.49
ECHO LVOT AREA: 4.5 CM2
ECHO LVOT DIAM: 2.4 CM
ECHO LVOT PEAK GRADIENT: 5 MMHG
ECHO LVOT PEAK VELOCITY: 1.1 M/S
ECHO MV A VELOCITY: 0.58 M/S
ECHO MV AREA PHT: 3.7 CM2
ECHO MV E DECELERATION TIME (DT): 204.7 MS
ECHO MV E VELOCITY: 0.7 M/S
ECHO MV E/A RATIO: 1.21
ECHO MV E/E' LATERAL: 6.36
ECHO MV E/E' RATIO (AVERAGED): 7.56
ECHO MV E/E' SEPTAL: 8.75
ECHO MV PRESSURE HALF TIME (PHT): 59.4 MS
ECHO RIGHT VENTRICULAR SYSTOLIC PRESSURE (RVSP): 24 MMHG
ECHO RV FREE WALL PEAK S': 18 CM/S
ECHO RV INTERNAL DIMENSION: 3.9 CM
ECHO RV TAPSE: 2.5 CM (ref 1.7–?)
ECHO TV REGURGITANT MAX VELOCITY: 2.27 M/S
ECHO TV REGURGITANT PEAK GRADIENT: 21 MMHG
EOSINOPHIL # BLD: 0 K/UL (ref 0–0.4)
EOSINOPHIL NFR BLD: 0 % (ref 0–7)
ERYTHROCYTE [DISTWIDTH] IN BLOOD BY AUTOMATED COUNT: 14.2 % (ref 11.5–14.5)
EST. AVERAGE GLUCOSE BLD GHB EST-MCNC: 123 MG/DL
GLUCOSE SERPL-MCNC: 163 MG/DL (ref 65–100)
HBA1C MFR BLD: 5.9 % (ref 4–5.6)
HCT VFR BLD AUTO: 37.7 % (ref 36.6–50.3)
HDLC SERPL-MCNC: 49 MG/DL
HDLC SERPL: 4 (ref 0–5)
HGB BLD-MCNC: 12.1 G/DL (ref 12.1–17)
IMM GRANULOCYTES # BLD AUTO: 0.1 K/UL (ref 0–0.04)
IMM GRANULOCYTES NFR BLD AUTO: 0 % (ref 0–0.5)
LDLC SERPL CALC-MCNC: 126.2 MG/DL (ref 0–100)
LYMPHOCYTES # BLD: 1 K/UL (ref 0.8–3.5)
LYMPHOCYTES NFR BLD: 9 % (ref 12–49)
MAGNESIUM SERPL-MCNC: 2 MG/DL (ref 1.6–2.4)
MCH RBC QN AUTO: 31 PG (ref 26–34)
MCHC RBC AUTO-ENTMCNC: 32.1 G/DL (ref 30–36.5)
MCV RBC AUTO: 96.7 FL (ref 80–99)
MONOCYTES # BLD: 0.7 K/UL (ref 0–1)
MONOCYTES NFR BLD: 6 % (ref 5–13)
NEUTS SEG # BLD: 9.9 K/UL (ref 1.8–8)
NEUTS SEG NFR BLD: 85 % (ref 32–75)
NRBC # BLD: 0 K/UL (ref 0–0.01)
NRBC BLD-RTO: 0 PER 100 WBC
P2Y12 PLT RESPONSE,PPPR: 269 PRU (ref 194–418)
PHOSPHATE SERPL-MCNC: 2.8 MG/DL (ref 2.6–4.7)
PLATELET # BLD AUTO: 263 K/UL (ref 150–400)
PMV BLD AUTO: 9.6 FL (ref 8.9–12.9)
POTASSIUM SERPL-SCNC: 4.3 MMOL/L (ref 3.5–5.1)
RBC # BLD AUTO: 3.9 M/UL (ref 4.1–5.7)
SODIUM SERPL-SCNC: 137 MMOL/L (ref 136–145)
TRIGL SERPL-MCNC: 109 MG/DL (ref ?–150)
VLDLC SERPL CALC-MCNC: 21.8 MG/DL
WBC # BLD AUTO: 11.7 K/UL (ref 4.1–11.1)

## 2023-04-17 PROCEDURE — 84100 ASSAY OF PHOSPHORUS: CPT

## 2023-04-17 PROCEDURE — 74011250637 HC RX REV CODE- 250/637: Performed by: NURSE PRACTITIONER

## 2023-04-17 PROCEDURE — 92523 SPEECH SOUND LANG COMPREHEN: CPT

## 2023-04-17 PROCEDURE — 74011250637 HC RX REV CODE- 250/637: Performed by: PSYCHIATRY & NEUROLOGY

## 2023-04-17 PROCEDURE — 70450 CT HEAD/BRAIN W/O DYE: CPT

## 2023-04-17 PROCEDURE — 83036 HEMOGLOBIN GLYCOSYLATED A1C: CPT

## 2023-04-17 PROCEDURE — 80061 LIPID PANEL: CPT

## 2023-04-17 PROCEDURE — 85576 BLOOD PLATELET AGGREGATION: CPT

## 2023-04-17 PROCEDURE — 85025 COMPLETE CBC W/AUTO DIFF WBC: CPT

## 2023-04-17 PROCEDURE — 74011250636 HC RX REV CODE- 250/636: Performed by: PSYCHIATRY & NEUROLOGY

## 2023-04-17 PROCEDURE — 80048 BASIC METABOLIC PNL TOTAL CA: CPT

## 2023-04-17 PROCEDURE — 97165 OT EVAL LOW COMPLEX 30 MIN: CPT

## 2023-04-17 PROCEDURE — 94669 MECHANICAL CHEST WALL OSCILL: CPT

## 2023-04-17 PROCEDURE — 36415 COLL VENOUS BLD VENIPUNCTURE: CPT

## 2023-04-17 PROCEDURE — 83735 ASSAY OF MAGNESIUM: CPT

## 2023-04-17 PROCEDURE — 93306 TTE W/DOPPLER COMPLETE: CPT

## 2023-04-17 PROCEDURE — 65610000006 HC RM INTENSIVE CARE

## 2023-04-17 PROCEDURE — 93306 TTE W/DOPPLER COMPLETE: CPT | Performed by: SPECIALIST

## 2023-04-17 PROCEDURE — 92610 EVALUATE SWALLOWING FUNCTION: CPT

## 2023-04-17 PROCEDURE — 99223 1ST HOSP IP/OBS HIGH 75: CPT | Performed by: PSYCHIATRY & NEUROLOGY

## 2023-04-17 PROCEDURE — 97162 PT EVAL MOD COMPLEX 30 MIN: CPT

## 2023-04-17 PROCEDURE — 74011250637 HC RX REV CODE- 250/637

## 2023-04-17 RX ORDER — ONDANSETRON 2 MG/ML
4 INJECTION INTRAMUSCULAR; INTRAVENOUS
Status: DISCONTINUED | OUTPATIENT
Start: 2023-04-17 | End: 2023-04-25 | Stop reason: HOSPADM

## 2023-04-17 RX ORDER — FACIAL-BODY WIPES
10 EACH TOPICAL DAILY PRN
Status: DISCONTINUED | OUTPATIENT
Start: 2023-04-17 | End: 2023-04-25 | Stop reason: HOSPADM

## 2023-04-17 RX ORDER — CLOPIDOGREL BISULFATE 75 MG/1
75 TABLET ORAL ONCE
Status: COMPLETED | OUTPATIENT
Start: 2023-04-17 | End: 2023-04-17

## 2023-04-17 RX ORDER — ASPIRIN 325 MG
325 TABLET ORAL ONCE
Status: COMPLETED | OUTPATIENT
Start: 2023-04-17 | End: 2023-04-17

## 2023-04-17 RX ORDER — GUAIFENESIN 100 MG/5ML
100 SOLUTION ORAL EVERY 6 HOURS
Status: COMPLETED | OUTPATIENT
Start: 2023-04-17 | End: 2023-04-19

## 2023-04-17 RX ORDER — AMOXICILLIN 250 MG
1 CAPSULE ORAL DAILY PRN
Status: DISCONTINUED | OUTPATIENT
Start: 2023-04-17 | End: 2023-04-23

## 2023-04-17 RX ORDER — GUAIFENESIN 100 MG/5ML
324 LIQUID (ML) ORAL DAILY
Status: DISCONTINUED | OUTPATIENT
Start: 2023-04-18 | End: 2023-04-23

## 2023-04-17 RX ADMIN — ATORVASTATIN CALCIUM 80 MG: 40 TABLET, FILM COATED ORAL at 20:00

## 2023-04-17 RX ADMIN — GUAIFENESIN 100 MG: 200 SOLUTION ORAL at 20:00

## 2023-04-17 RX ADMIN — CLOPIDOGREL BISULFATE 75 MG: 75 TABLET ORAL at 06:18

## 2023-04-17 RX ADMIN — GUAIFENESIN 100 MG: 200 SOLUTION ORAL at 15:23

## 2023-04-17 RX ADMIN — ASPIRIN 325 MG: 325 TABLET ORAL at 06:18

## 2023-04-17 RX ADMIN — CLOPIDOGREL BISULFATE 75 MG: 75 TABLET ORAL at 09:19

## 2023-04-17 RX ADMIN — SODIUM CHLORIDE 75 ML/HR: 9 INJECTION, SOLUTION INTRAVENOUS at 02:36

## 2023-04-17 RX ADMIN — SODIUM CHLORIDE 75 ML/HR: 9 INJECTION, SOLUTION INTRAVENOUS at 15:29

## 2023-04-18 LAB
ANION GAP SERPL CALC-SCNC: 5 MMOL/L (ref 5–15)
ASPIRIN TEST, ASPIRN: 434 ARU
BUN SERPL-MCNC: 21 MG/DL (ref 6–20)
BUN/CREAT SERPL: 22 (ref 12–20)
CALCIUM SERPL-MCNC: 8.3 MG/DL (ref 8.5–10.1)
CHLORIDE SERPL-SCNC: 110 MMOL/L (ref 97–108)
CO2 SERPL-SCNC: 22 MMOL/L (ref 21–32)
CREAT SERPL-MCNC: 0.96 MG/DL (ref 0.7–1.3)
ERYTHROCYTE [DISTWIDTH] IN BLOOD BY AUTOMATED COUNT: 13.8 % (ref 11.5–14.5)
GLUCOSE SERPL-MCNC: 121 MG/DL (ref 65–100)
HCT VFR BLD AUTO: 33.5 % (ref 36.6–50.3)
HGB BLD-MCNC: 11.1 G/DL (ref 12.1–17)
MCH RBC QN AUTO: 31.7 PG (ref 26–34)
MCHC RBC AUTO-ENTMCNC: 33.1 G/DL (ref 30–36.5)
MCV RBC AUTO: 95.7 FL (ref 80–99)
NRBC # BLD: 0 K/UL (ref 0–0.01)
NRBC BLD-RTO: 0 PER 100 WBC
P2Y12 PLT RESPONSE,PPPR: 279 PRU (ref 194–418)
PLATELET # BLD AUTO: 262 K/UL (ref 150–400)
PMV BLD AUTO: 10 FL (ref 8.9–12.9)
POTASSIUM SERPL-SCNC: 4 MMOL/L (ref 3.5–5.1)
RBC # BLD AUTO: 3.5 M/UL (ref 4.1–5.7)
SODIUM SERPL-SCNC: 137 MMOL/L (ref 136–145)
WBC # BLD AUTO: 11.2 K/UL (ref 4.1–11.1)

## 2023-04-18 PROCEDURE — 85576 BLOOD PLATELET AGGREGATION: CPT

## 2023-04-18 PROCEDURE — 74011250637 HC RX REV CODE- 250/637: Performed by: PSYCHIATRY & NEUROLOGY

## 2023-04-18 PROCEDURE — 94669 MECHANICAL CHEST WALL OSCILL: CPT

## 2023-04-18 PROCEDURE — 85027 COMPLETE CBC AUTOMATED: CPT

## 2023-04-18 PROCEDURE — APPNB45 APP NON BILLABLE 31-45 MINUTES: Performed by: NURSE PRACTITIONER

## 2023-04-18 PROCEDURE — 74011250636 HC RX REV CODE- 250/636: Performed by: NURSE PRACTITIONER

## 2023-04-18 PROCEDURE — 74011250637 HC RX REV CODE- 250/637: Performed by: NURSE PRACTITIONER

## 2023-04-18 PROCEDURE — 97530 THERAPEUTIC ACTIVITIES: CPT

## 2023-04-18 PROCEDURE — 99233 SBSQ HOSP IP/OBS HIGH 50: CPT | Performed by: PSYCHIATRY & NEUROLOGY

## 2023-04-18 PROCEDURE — 74011250636 HC RX REV CODE- 250/636: Performed by: PSYCHIATRY & NEUROLOGY

## 2023-04-18 PROCEDURE — 80048 BASIC METABOLIC PNL TOTAL CA: CPT

## 2023-04-18 PROCEDURE — 36415 COLL VENOUS BLD VENIPUNCTURE: CPT

## 2023-04-18 PROCEDURE — 65610000006 HC RM INTENSIVE CARE

## 2023-04-18 RX ORDER — DONEPEZIL HYDROCHLORIDE 10 MG/1
10 TABLET, FILM COATED ORAL
Status: DISCONTINUED | OUTPATIENT
Start: 2023-04-18 | End: 2023-04-23

## 2023-04-18 RX ORDER — CHOLECALCIFEROL (VITAMIN D3) 125 MCG
50 CAPSULE ORAL DAILY
COMMUNITY
End: 2023-04-25

## 2023-04-18 RX ORDER — HEPARIN SODIUM 5000 [USP'U]/ML
5000 INJECTION, SOLUTION INTRAVENOUS; SUBCUTANEOUS ONCE
Status: DISCONTINUED | OUTPATIENT
Start: 2023-04-18 | End: 2023-04-18

## 2023-04-18 RX ORDER — HEPARIN SODIUM 5000 [USP'U]/ML
5000 INJECTION, SOLUTION INTRAVENOUS; SUBCUTANEOUS EVERY 8 HOURS
Status: DISCONTINUED | OUTPATIENT
Start: 2023-04-18 | End: 2023-04-23

## 2023-04-18 RX ORDER — ALLOPURINOL 300 MG/1
300 TABLET ORAL DAILY
Status: DISCONTINUED | OUTPATIENT
Start: 2023-04-19 | End: 2023-04-23

## 2023-04-18 RX ORDER — DONEPEZIL HYDROCHLORIDE 10 MG/1
10 TABLET, FILM COATED ORAL
COMMUNITY
End: 2023-04-25

## 2023-04-18 RX ADMIN — GUAIFENESIN 100 MG: 200 SOLUTION ORAL at 16:34

## 2023-04-18 RX ADMIN — DONEPEZIL HYDROCHLORIDE 10 MG: 10 TABLET, FILM COATED ORAL at 22:26

## 2023-04-18 RX ADMIN — ATORVASTATIN CALCIUM 80 MG: 40 TABLET, FILM COATED ORAL at 20:00

## 2023-04-18 RX ADMIN — HEPARIN SODIUM 5000 UNITS: 5000 INJECTION INTRAVENOUS; SUBCUTANEOUS at 12:09

## 2023-04-18 RX ADMIN — SODIUM CHLORIDE 75 ML/HR: 9 INJECTION, SOLUTION INTRAVENOUS at 03:25

## 2023-04-18 RX ADMIN — HEPARIN SODIUM 5000 UNITS: 5000 INJECTION INTRAVENOUS; SUBCUTANEOUS at 20:01

## 2023-04-18 RX ADMIN — GUAIFENESIN 100 MG: 200 SOLUTION ORAL at 08:11

## 2023-04-18 RX ADMIN — CLOPIDOGREL BISULFATE 75 MG: 75 TABLET ORAL at 08:11

## 2023-04-18 RX ADMIN — GUAIFENESIN 100 MG: 200 SOLUTION ORAL at 20:01

## 2023-04-18 RX ADMIN — GUAIFENESIN 100 MG: 200 SOLUTION ORAL at 03:25

## 2023-04-18 RX ADMIN — ASPIRIN 81 MG CHEWABLE TABLET 324 MG: 81 TABLET CHEWABLE at 08:11

## 2023-04-19 ENCOUNTER — APPOINTMENT (OUTPATIENT)
Dept: VASCULAR SURGERY | Age: 78
DRG: 023 | End: 2023-04-19
Attending: INTERNAL MEDICINE
Payer: MEDICARE

## 2023-04-19 LAB
25(OH)D3 SERPL-MCNC: 35.2 NG/ML (ref 30–100)
ANION GAP SERPL CALC-SCNC: 6 MMOL/L (ref 5–15)
BUN SERPL-MCNC: 20 MG/DL (ref 6–20)
BUN/CREAT SERPL: 21 (ref 12–20)
CALCIUM SERPL-MCNC: 8.3 MG/DL (ref 8.5–10.1)
CHLORIDE SERPL-SCNC: 104 MMOL/L (ref 97–108)
CO2 SERPL-SCNC: 24 MMOL/L (ref 21–32)
CREAT SERPL-MCNC: 0.94 MG/DL (ref 0.7–1.3)
ERYTHROCYTE [DISTWIDTH] IN BLOOD BY AUTOMATED COUNT: 14 % (ref 11.5–14.5)
GLUCOSE SERPL-MCNC: 110 MG/DL (ref 65–100)
HCT VFR BLD AUTO: 33.2 % (ref 36.6–50.3)
HGB BLD-MCNC: 10.8 G/DL (ref 12.1–17)
MAGNESIUM SERPL-MCNC: 2 MG/DL (ref 1.6–2.4)
MCH RBC QN AUTO: 30.9 PG (ref 26–34)
MCHC RBC AUTO-ENTMCNC: 32.5 G/DL (ref 30–36.5)
MCV RBC AUTO: 94.9 FL (ref 80–99)
NRBC # BLD: 0 K/UL (ref 0–0.01)
NRBC BLD-RTO: 0 PER 100 WBC
P2Y12 PLT RESPONSE,PPPR: 271 PRU (ref 194–418)
PHOSPHATE SERPL-MCNC: 2.2 MG/DL (ref 2.6–4.7)
PLATELET # BLD AUTO: 266 K/UL (ref 150–400)
PMV BLD AUTO: 9.8 FL (ref 8.9–12.9)
POTASSIUM SERPL-SCNC: 3.8 MMOL/L (ref 3.5–5.1)
RBC # BLD AUTO: 3.5 M/UL (ref 4.1–5.7)
SODIUM SERPL-SCNC: 134 MMOL/L (ref 136–145)
WBC # BLD AUTO: 10.8 K/UL (ref 4.1–11.1)

## 2023-04-19 PROCEDURE — 82306 VITAMIN D 25 HYDROXY: CPT

## 2023-04-19 PROCEDURE — APPNB45 APP NON BILLABLE 31-45 MINUTES: Performed by: NURSE PRACTITIONER

## 2023-04-19 PROCEDURE — 83735 ASSAY OF MAGNESIUM: CPT

## 2023-04-19 PROCEDURE — 93971 EXTREMITY STUDY: CPT

## 2023-04-19 PROCEDURE — 99231 SBSQ HOSP IP/OBS SF/LOW 25: CPT | Performed by: PSYCHIATRY & NEUROLOGY

## 2023-04-19 PROCEDURE — 74011250637 HC RX REV CODE- 250/637: Performed by: PSYCHIATRY & NEUROLOGY

## 2023-04-19 PROCEDURE — 36415 COLL VENOUS BLD VENIPUNCTURE: CPT

## 2023-04-19 PROCEDURE — 99223 1ST HOSP IP/OBS HIGH 75: CPT | Performed by: PHYSICAL MEDICINE & REHABILITATION

## 2023-04-19 PROCEDURE — 74011250637 HC RX REV CODE- 250/637: Performed by: NURSE PRACTITIONER

## 2023-04-19 PROCEDURE — 94669 MECHANICAL CHEST WALL OSCILL: CPT

## 2023-04-19 PROCEDURE — 65660000001 HC RM ICU INTERMED STEPDOWN

## 2023-04-19 PROCEDURE — 84100 ASSAY OF PHOSPHORUS: CPT

## 2023-04-19 PROCEDURE — 85576 BLOOD PLATELET AGGREGATION: CPT

## 2023-04-19 PROCEDURE — 74011250636 HC RX REV CODE- 250/636: Performed by: NURSE PRACTITIONER

## 2023-04-19 PROCEDURE — 85027 COMPLETE CBC AUTOMATED: CPT

## 2023-04-19 PROCEDURE — 80048 BASIC METABOLIC PNL TOTAL CA: CPT

## 2023-04-19 RX ORDER — SODIUM,POTASSIUM PHOSPHATES 280-250MG
2 POWDER IN PACKET (EA) ORAL 2 TIMES DAILY
Status: COMPLETED | OUTPATIENT
Start: 2023-04-19 | End: 2023-04-19

## 2023-04-19 RX ORDER — CLOPIDOGREL BISULFATE 75 MG/1
150 TABLET ORAL ONCE
Status: COMPLETED | OUTPATIENT
Start: 2023-04-19 | End: 2023-04-19

## 2023-04-19 RX ADMIN — CLOPIDOGREL BISULFATE 75 MG: 75 TABLET ORAL at 08:07

## 2023-04-19 RX ADMIN — GUAIFENESIN 100 MG: 200 SOLUTION ORAL at 20:10

## 2023-04-19 RX ADMIN — GUAIFENESIN 100 MG: 200 SOLUTION ORAL at 03:16

## 2023-04-19 RX ADMIN — HEPARIN SODIUM 5000 UNITS: 5000 INJECTION INTRAVENOUS; SUBCUTANEOUS at 11:38

## 2023-04-19 RX ADMIN — CLOPIDOGREL BISULFATE 150 MG: 75 TABLET ORAL at 03:15

## 2023-04-19 RX ADMIN — HEPARIN SODIUM 5000 UNITS: 5000 INJECTION INTRAVENOUS; SUBCUTANEOUS at 20:10

## 2023-04-19 RX ADMIN — HEPARIN SODIUM 5000 UNITS: 5000 INJECTION INTRAVENOUS; SUBCUTANEOUS at 03:15

## 2023-04-19 RX ADMIN — ALLOPURINOL 300 MG: 300 TABLET ORAL at 08:07

## 2023-04-19 RX ADMIN — DONEPEZIL HYDROCHLORIDE 10 MG: 10 TABLET, FILM COATED ORAL at 21:39

## 2023-04-19 RX ADMIN — GUAIFENESIN 100 MG: 200 SOLUTION ORAL at 08:07

## 2023-04-19 RX ADMIN — POTASSIUM & SODIUM PHOSPHATES POWDER PACK 280-160-250 MG 2 PACKET: 280-160-250 PACK at 17:17

## 2023-04-19 RX ADMIN — ASPIRIN 81 MG CHEWABLE TABLET 324 MG: 81 TABLET CHEWABLE at 08:07

## 2023-04-19 RX ADMIN — POTASSIUM & SODIUM PHOSPHATES POWDER PACK 280-160-250 MG 2 PACKET: 280-160-250 PACK at 11:38

## 2023-04-19 RX ADMIN — GUAIFENESIN 100 MG: 200 SOLUTION ORAL at 17:17

## 2023-04-19 RX ADMIN — ATORVASTATIN CALCIUM 80 MG: 40 TABLET, FILM COATED ORAL at 20:10

## 2023-04-20 LAB
ANION GAP SERPL CALC-SCNC: 7 MMOL/L (ref 5–15)
ATRIAL RATE: 58 BPM
BUN SERPL-MCNC: 24 MG/DL (ref 6–20)
BUN/CREAT SERPL: 22 (ref 12–20)
CALCIUM SERPL-MCNC: 8.7 MG/DL (ref 8.5–10.1)
CALCULATED P AXIS, ECG09: 59 DEGREES
CALCULATED R AXIS, ECG10: 17 DEGREES
CALCULATED T AXIS, ECG11: 64 DEGREES
CHLORIDE SERPL-SCNC: 102 MMOL/L (ref 97–108)
CO2 SERPL-SCNC: 25 MMOL/L (ref 21–32)
CREAT SERPL-MCNC: 1.11 MG/DL (ref 0.7–1.3)
DIAGNOSIS, 93000: NORMAL
ERYTHROCYTE [DISTWIDTH] IN BLOOD BY AUTOMATED COUNT: 13.7 % (ref 11.5–14.5)
GLUCOSE SERPL-MCNC: 126 MG/DL (ref 65–100)
HCT VFR BLD AUTO: 34 % (ref 36.6–50.3)
HGB BLD-MCNC: 11.2 G/DL (ref 12.1–17)
MAGNESIUM SERPL-MCNC: 2 MG/DL (ref 1.6–2.4)
MCH RBC QN AUTO: 31.1 PG (ref 26–34)
MCHC RBC AUTO-ENTMCNC: 32.9 G/DL (ref 30–36.5)
MCV RBC AUTO: 94.4 FL (ref 80–99)
NRBC # BLD: 0 K/UL (ref 0–0.01)
NRBC BLD-RTO: 0 PER 100 WBC
P-R INTERVAL, ECG05: 156 MS
PHOSPHATE SERPL-MCNC: 2.6 MG/DL (ref 2.6–4.7)
PLATELET # BLD AUTO: 289 K/UL (ref 150–400)
PMV BLD AUTO: 9.7 FL (ref 8.9–12.9)
POTASSIUM SERPL-SCNC: 3.7 MMOL/L (ref 3.5–5.1)
Q-T INTERVAL, ECG07: 446 MS
QRS DURATION, ECG06: 110 MS
QTC CALCULATION (BEZET), ECG08: 437 MS
RBC # BLD AUTO: 3.6 M/UL (ref 4.1–5.7)
SODIUM SERPL-SCNC: 134 MMOL/L (ref 136–145)
VENTRICULAR RATE, ECG03: 58 BPM
WBC # BLD AUTO: 10.9 K/UL (ref 4.1–11.1)

## 2023-04-20 PROCEDURE — 85027 COMPLETE CBC AUTOMATED: CPT

## 2023-04-20 PROCEDURE — 80048 BASIC METABOLIC PNL TOTAL CA: CPT

## 2023-04-20 PROCEDURE — 74011250637 HC RX REV CODE- 250/637: Performed by: NURSE PRACTITIONER

## 2023-04-20 PROCEDURE — 97535 SELF CARE MNGMENT TRAINING: CPT

## 2023-04-20 PROCEDURE — 36415 COLL VENOUS BLD VENIPUNCTURE: CPT

## 2023-04-20 PROCEDURE — 83735 ASSAY OF MAGNESIUM: CPT

## 2023-04-20 PROCEDURE — 65660000001 HC RM ICU INTERMED STEPDOWN

## 2023-04-20 PROCEDURE — 84100 ASSAY OF PHOSPHORUS: CPT

## 2023-04-20 PROCEDURE — 74011250636 HC RX REV CODE- 250/636: Performed by: NURSE PRACTITIONER

## 2023-04-20 PROCEDURE — 97530 THERAPEUTIC ACTIVITIES: CPT

## 2023-04-20 PROCEDURE — 74011250637 HC RX REV CODE- 250/637: Performed by: PSYCHIATRY & NEUROLOGY

## 2023-04-20 PROCEDURE — 94669 MECHANICAL CHEST WALL OSCILL: CPT

## 2023-04-20 RX ADMIN — ATORVASTATIN CALCIUM 80 MG: 40 TABLET, FILM COATED ORAL at 22:01

## 2023-04-20 RX ADMIN — ASPIRIN 81 MG CHEWABLE TABLET 324 MG: 81 TABLET CHEWABLE at 08:10

## 2023-04-20 RX ADMIN — HEPARIN SODIUM 5000 UNITS: 5000 INJECTION INTRAVENOUS; SUBCUTANEOUS at 04:34

## 2023-04-20 RX ADMIN — HEPARIN SODIUM 5000 UNITS: 5000 INJECTION INTRAVENOUS; SUBCUTANEOUS at 20:00

## 2023-04-20 RX ADMIN — CLOPIDOGREL BISULFATE 75 MG: 75 TABLET ORAL at 08:10

## 2023-04-20 RX ADMIN — DOCUSATE SODIUM 50 MG AND SENNOSIDES 8.6 MG 1 TABLET: 8.6; 5 TABLET, FILM COATED ORAL at 16:16

## 2023-04-20 RX ADMIN — DONEPEZIL HYDROCHLORIDE 10 MG: 10 TABLET, FILM COATED ORAL at 22:01

## 2023-04-20 RX ADMIN — ALLOPURINOL 300 MG: 300 TABLET ORAL at 08:10

## 2023-04-20 RX ADMIN — HEPARIN SODIUM 5000 UNITS: 5000 INJECTION INTRAVENOUS; SUBCUTANEOUS at 11:25

## 2023-04-21 ENCOUNTER — APPOINTMENT (OUTPATIENT)
Dept: GENERAL RADIOLOGY | Age: 78
DRG: 023 | End: 2023-04-21
Attending: INTERNAL MEDICINE
Payer: MEDICARE

## 2023-04-21 LAB
ANION GAP SERPL CALC-SCNC: 6 MMOL/L (ref 5–15)
BUN SERPL-MCNC: 25 MG/DL (ref 6–20)
BUN/CREAT SERPL: 25 (ref 12–20)
CALCIUM SERPL-MCNC: 8.6 MG/DL (ref 8.5–10.1)
CHLORIDE SERPL-SCNC: 102 MMOL/L (ref 97–108)
CO2 SERPL-SCNC: 26 MMOL/L (ref 21–32)
CREAT SERPL-MCNC: 1.01 MG/DL (ref 0.7–1.3)
ERYTHROCYTE [DISTWIDTH] IN BLOOD BY AUTOMATED COUNT: 14.1 % (ref 11.5–14.5)
GLUCOSE SERPL-MCNC: 114 MG/DL (ref 65–100)
HCT VFR BLD AUTO: 32.9 % (ref 36.6–50.3)
HGB BLD-MCNC: 10.8 G/DL (ref 12.1–17)
MAGNESIUM SERPL-MCNC: 1.9 MG/DL (ref 1.6–2.4)
MCH RBC QN AUTO: 30.9 PG (ref 26–34)
MCHC RBC AUTO-ENTMCNC: 32.8 G/DL (ref 30–36.5)
MCV RBC AUTO: 94.3 FL (ref 80–99)
NRBC # BLD: 0 K/UL (ref 0–0.01)
NRBC BLD-RTO: 0 PER 100 WBC
PHOSPHATE SERPL-MCNC: 3.6 MG/DL (ref 2.6–4.7)
PLATELET # BLD AUTO: 318 K/UL (ref 150–400)
PMV BLD AUTO: 9.8 FL (ref 8.9–12.9)
POTASSIUM SERPL-SCNC: 4.2 MMOL/L (ref 3.5–5.1)
RBC # BLD AUTO: 3.49 M/UL (ref 4.1–5.7)
SODIUM SERPL-SCNC: 134 MMOL/L (ref 136–145)
WBC # BLD AUTO: 9.7 K/UL (ref 4.1–11.1)

## 2023-04-21 PROCEDURE — 36415 COLL VENOUS BLD VENIPUNCTURE: CPT

## 2023-04-21 PROCEDURE — 99233 SBSQ HOSP IP/OBS HIGH 50: CPT | Performed by: PHYSICAL MEDICINE & REHABILITATION

## 2023-04-21 PROCEDURE — 74011000258 HC RX REV CODE- 258: Performed by: INTERNAL MEDICINE

## 2023-04-21 PROCEDURE — 74011250637 HC RX REV CODE- 250/637: Performed by: INTERNAL MEDICINE

## 2023-04-21 PROCEDURE — 97530 THERAPEUTIC ACTIVITIES: CPT

## 2023-04-21 PROCEDURE — 74011250637 HC RX REV CODE- 250/637: Performed by: PSYCHIATRY & NEUROLOGY

## 2023-04-21 PROCEDURE — 74011250636 HC RX REV CODE- 250/636: Performed by: INTERNAL MEDICINE

## 2023-04-21 PROCEDURE — 97535 SELF CARE MNGMENT TRAINING: CPT

## 2023-04-21 PROCEDURE — 84100 ASSAY OF PHOSPHORUS: CPT

## 2023-04-21 PROCEDURE — 74018 RADEX ABDOMEN 1 VIEW: CPT

## 2023-04-21 PROCEDURE — 85027 COMPLETE CBC AUTOMATED: CPT

## 2023-04-21 PROCEDURE — 74011000250 HC RX REV CODE- 250: Performed by: INTERNAL MEDICINE

## 2023-04-21 PROCEDURE — 74011250637 HC RX REV CODE- 250/637: Performed by: NURSE PRACTITIONER

## 2023-04-21 PROCEDURE — 80048 BASIC METABOLIC PNL TOTAL CA: CPT

## 2023-04-21 PROCEDURE — 71045 X-RAY EXAM CHEST 1 VIEW: CPT

## 2023-04-21 PROCEDURE — 74011250636 HC RX REV CODE- 250/636: Performed by: NURSE PRACTITIONER

## 2023-04-21 PROCEDURE — 65660000001 HC RM ICU INTERMED STEPDOWN

## 2023-04-21 PROCEDURE — 83735 ASSAY OF MAGNESIUM: CPT

## 2023-04-21 RX ORDER — SCOLOPAMINE TRANSDERMAL SYSTEM 1 MG/1
1 PATCH, EXTENDED RELEASE TRANSDERMAL
Status: DISCONTINUED | OUTPATIENT
Start: 2023-04-21 | End: 2023-04-25 | Stop reason: HOSPADM

## 2023-04-21 RX ORDER — SODIUM CHLORIDE 9 MG/ML
75 INJECTION, SOLUTION INTRAVENOUS CONTINUOUS
Status: DISPENSED | OUTPATIENT
Start: 2023-04-21 | End: 2023-04-22

## 2023-04-21 RX ORDER — LIDOCAINE HYDROCHLORIDE 40 MG/ML
0.1 INJECTION, SOLUTION RETROBULBAR; TOPICAL
Status: COMPLETED | OUTPATIENT
Start: 2023-04-21 | End: 2023-04-21

## 2023-04-21 RX ADMIN — PIPERACILLIN AND TAZOBACTAM 3.38 G: 3; .375 INJECTION, POWDER, LYOPHILIZED, FOR SOLUTION INTRAVENOUS at 23:39

## 2023-04-21 RX ADMIN — ASPIRIN 81 MG CHEWABLE TABLET 324 MG: 81 TABLET CHEWABLE at 12:59

## 2023-04-21 RX ADMIN — HEPARIN SODIUM 5000 UNITS: 5000 INJECTION INTRAVENOUS; SUBCUTANEOUS at 12:25

## 2023-04-21 RX ADMIN — ATORVASTATIN CALCIUM 80 MG: 40 TABLET, FILM COATED ORAL at 20:34

## 2023-04-21 RX ADMIN — DONEPEZIL HYDROCHLORIDE 10 MG: 10 TABLET, FILM COATED ORAL at 20:34

## 2023-04-21 RX ADMIN — ALLOPURINOL 300 MG: 300 TABLET ORAL at 12:59

## 2023-04-21 RX ADMIN — LIDOCAINE HYDROCHLORIDE 4 MG: 40 INJECTION, SOLUTION RETROBULBAR; TOPICAL at 12:25

## 2023-04-21 RX ADMIN — PIPERACILLIN AND TAZOBACTAM 4.5 G: 4; .5 INJECTION, POWDER, FOR SOLUTION INTRAVENOUS at 18:49

## 2023-04-21 RX ADMIN — HEPARIN SODIUM 5000 UNITS: 5000 INJECTION INTRAVENOUS; SUBCUTANEOUS at 05:00

## 2023-04-21 RX ADMIN — SODIUM CHLORIDE 75 ML/HR: 9 INJECTION, SOLUTION INTRAVENOUS at 18:54

## 2023-04-21 RX ADMIN — HEPARIN SODIUM 5000 UNITS: 5000 INJECTION INTRAVENOUS; SUBCUTANEOUS at 20:34

## 2023-04-21 RX ADMIN — CLOPIDOGREL BISULFATE 75 MG: 75 TABLET ORAL at 12:59

## 2023-04-22 ENCOUNTER — HOSPICE ADMISSION (OUTPATIENT)
Dept: HOSPICE | Facility: HOSPICE | Age: 78
End: 2023-04-22
Payer: MEDICARE

## 2023-04-22 LAB
ANION GAP SERPL CALC-SCNC: 9 MMOL/L (ref 5–15)
BUN SERPL-MCNC: 31 MG/DL (ref 6–20)
BUN/CREAT SERPL: 31 (ref 12–20)
CALCIUM SERPL-MCNC: 8.6 MG/DL (ref 8.5–10.1)
CHLORIDE SERPL-SCNC: 103 MMOL/L (ref 97–108)
CO2 SERPL-SCNC: 26 MMOL/L (ref 21–32)
CREAT SERPL-MCNC: 1 MG/DL (ref 0.7–1.3)
ERYTHROCYTE [DISTWIDTH] IN BLOOD BY AUTOMATED COUNT: 14.1 % (ref 11.5–14.5)
GLUCOSE SERPL-MCNC: 117 MG/DL (ref 65–100)
HCT VFR BLD AUTO: 36.6 % (ref 36.6–50.3)
HGB BLD-MCNC: 11.8 G/DL (ref 12.1–17)
MAGNESIUM SERPL-MCNC: 2.1 MG/DL (ref 1.6–2.4)
MCH RBC QN AUTO: 31.2 PG (ref 26–34)
MCHC RBC AUTO-ENTMCNC: 32.2 G/DL (ref 30–36.5)
MCV RBC AUTO: 96.8 FL (ref 80–99)
NRBC # BLD: 0 K/UL (ref 0–0.01)
NRBC BLD-RTO: 0 PER 100 WBC
PHOSPHATE SERPL-MCNC: 4.1 MG/DL (ref 2.6–4.7)
PLATELET # BLD AUTO: 311 K/UL (ref 150–400)
PMV BLD AUTO: 9.7 FL (ref 8.9–12.9)
POTASSIUM SERPL-SCNC: 4.6 MMOL/L (ref 3.5–5.1)
RBC # BLD AUTO: 3.78 M/UL (ref 4.1–5.7)
SODIUM SERPL-SCNC: 138 MMOL/L (ref 136–145)
WBC # BLD AUTO: 13.3 K/UL (ref 4.1–11.1)

## 2023-04-22 PROCEDURE — 36415 COLL VENOUS BLD VENIPUNCTURE: CPT

## 2023-04-22 PROCEDURE — 65660000001 HC RM ICU INTERMED STEPDOWN

## 2023-04-22 PROCEDURE — 74011000258 HC RX REV CODE- 258: Performed by: INTERNAL MEDICINE

## 2023-04-22 PROCEDURE — 74011250636 HC RX REV CODE- 250/636: Performed by: INTERNAL MEDICINE

## 2023-04-22 PROCEDURE — 84100 ASSAY OF PHOSPHORUS: CPT

## 2023-04-22 PROCEDURE — 80048 BASIC METABOLIC PNL TOTAL CA: CPT

## 2023-04-22 PROCEDURE — 85027 COMPLETE CBC AUTOMATED: CPT

## 2023-04-22 PROCEDURE — 74011250637 HC RX REV CODE- 250/637: Performed by: PSYCHIATRY & NEUROLOGY

## 2023-04-22 PROCEDURE — 74011250636 HC RX REV CODE- 250/636: Performed by: NURSE PRACTITIONER

## 2023-04-22 PROCEDURE — 74011250637 HC RX REV CODE- 250/637: Performed by: NURSE PRACTITIONER

## 2023-04-22 PROCEDURE — 83735 ASSAY OF MAGNESIUM: CPT

## 2023-04-22 RX ADMIN — HEPARIN SODIUM 5000 UNITS: 5000 INJECTION INTRAVENOUS; SUBCUTANEOUS at 11:29

## 2023-04-22 RX ADMIN — ALLOPURINOL 300 MG: 300 TABLET ORAL at 08:17

## 2023-04-22 RX ADMIN — PIPERACILLIN AND TAZOBACTAM 3.38 G: 3; .375 INJECTION, POWDER, LYOPHILIZED, FOR SOLUTION INTRAVENOUS at 23:22

## 2023-04-22 RX ADMIN — HEPARIN SODIUM 5000 UNITS: 5000 INJECTION INTRAVENOUS; SUBCUTANEOUS at 20:37

## 2023-04-22 RX ADMIN — ASPIRIN 81 MG CHEWABLE TABLET 324 MG: 81 TABLET CHEWABLE at 08:17

## 2023-04-22 RX ADMIN — PIPERACILLIN AND TAZOBACTAM 3.38 G: 3; .375 INJECTION, POWDER, LYOPHILIZED, FOR SOLUTION INTRAVENOUS at 08:17

## 2023-04-22 RX ADMIN — PIPERACILLIN AND TAZOBACTAM 3.38 G: 3; .375 INJECTION, POWDER, LYOPHILIZED, FOR SOLUTION INTRAVENOUS at 16:19

## 2023-04-22 RX ADMIN — CLOPIDOGREL BISULFATE 75 MG: 75 TABLET ORAL at 08:17

## 2023-04-22 RX ADMIN — HEPARIN SODIUM 5000 UNITS: 5000 INJECTION INTRAVENOUS; SUBCUTANEOUS at 04:44

## 2023-04-22 RX ADMIN — SODIUM CHLORIDE 75 ML/HR: 9 INJECTION, SOLUTION INTRAVENOUS at 05:36

## 2023-04-22 RX ADMIN — DONEPEZIL HYDROCHLORIDE 10 MG: 10 TABLET, FILM COATED ORAL at 20:36

## 2023-04-22 RX ADMIN — ATORVASTATIN CALCIUM 80 MG: 40 TABLET, FILM COATED ORAL at 20:36

## 2023-04-22 RX ADMIN — SODIUM CHLORIDE 75 ML/HR: 9 INJECTION, SOLUTION INTRAVENOUS at 16:19

## 2023-04-23 LAB
ANION GAP SERPL CALC-SCNC: 2 MMOL/L (ref 5–15)
BUN SERPL-MCNC: 32 MG/DL (ref 6–20)
BUN/CREAT SERPL: 31 (ref 12–20)
CALCIUM SERPL-MCNC: 8.4 MG/DL (ref 8.5–10.1)
CHLORIDE SERPL-SCNC: 106 MMOL/L (ref 97–108)
CO2 SERPL-SCNC: 29 MMOL/L (ref 21–32)
CREAT SERPL-MCNC: 1.04 MG/DL (ref 0.7–1.3)
ERYTHROCYTE [DISTWIDTH] IN BLOOD BY AUTOMATED COUNT: 14.2 % (ref 11.5–14.5)
GLUCOSE SERPL-MCNC: 113 MG/DL (ref 65–100)
HCT VFR BLD AUTO: 30.7 % (ref 36.6–50.3)
HGB BLD-MCNC: 9.8 G/DL (ref 12.1–17)
MAGNESIUM SERPL-MCNC: 2.1 MG/DL (ref 1.6–2.4)
MCH RBC QN AUTO: 30.8 PG (ref 26–34)
MCHC RBC AUTO-ENTMCNC: 31.9 G/DL (ref 30–36.5)
MCV RBC AUTO: 96.5 FL (ref 80–99)
NRBC # BLD: 0 K/UL (ref 0–0.01)
NRBC BLD-RTO: 0 PER 100 WBC
PHOSPHATE SERPL-MCNC: 3 MG/DL (ref 2.6–4.7)
PLATELET # BLD AUTO: 275 K/UL (ref 150–400)
PMV BLD AUTO: 9.9 FL (ref 8.9–12.9)
POTASSIUM SERPL-SCNC: 4.3 MMOL/L (ref 3.5–5.1)
RBC # BLD AUTO: 3.18 M/UL (ref 4.1–5.7)
SODIUM SERPL-SCNC: 137 MMOL/L (ref 136–145)
WBC # BLD AUTO: 9.3 K/UL (ref 4.1–11.1)

## 2023-04-23 PROCEDURE — 74011250636 HC RX REV CODE- 250/636: Performed by: NURSE PRACTITIONER

## 2023-04-23 PROCEDURE — 84100 ASSAY OF PHOSPHORUS: CPT

## 2023-04-23 PROCEDURE — 74011250637 HC RX REV CODE- 250/637: Performed by: NURSE PRACTITIONER

## 2023-04-23 PROCEDURE — 65270000032 HC RM SEMIPRIVATE

## 2023-04-23 PROCEDURE — 85027 COMPLETE CBC AUTOMATED: CPT

## 2023-04-23 PROCEDURE — 83735 ASSAY OF MAGNESIUM: CPT

## 2023-04-23 PROCEDURE — 36415 COLL VENOUS BLD VENIPUNCTURE: CPT

## 2023-04-23 PROCEDURE — 80048 BASIC METABOLIC PNL TOTAL CA: CPT

## 2023-04-23 PROCEDURE — 74011250636 HC RX REV CODE- 250/636: Performed by: INTERNAL MEDICINE

## 2023-04-23 PROCEDURE — 74011000258 HC RX REV CODE- 258: Performed by: INTERNAL MEDICINE

## 2023-04-23 RX ORDER — GLYCOPYRROLATE 1 MG/1
1 TABLET ORAL
Status: DISCONTINUED | OUTPATIENT
Start: 2023-04-23 | End: 2023-04-25 | Stop reason: HOSPADM

## 2023-04-23 RX ADMIN — HEPARIN SODIUM 5000 UNITS: 5000 INJECTION INTRAVENOUS; SUBCUTANEOUS at 12:43

## 2023-04-23 RX ADMIN — ALLOPURINOL 300 MG: 300 TABLET ORAL at 08:13

## 2023-04-23 RX ADMIN — ASPIRIN 81 MG CHEWABLE TABLET 324 MG: 81 TABLET CHEWABLE at 08:13

## 2023-04-23 RX ADMIN — PIPERACILLIN AND TAZOBACTAM 3.38 G: 3; .375 INJECTION, POWDER, LYOPHILIZED, FOR SOLUTION INTRAVENOUS at 08:13

## 2023-04-23 RX ADMIN — CLOPIDOGREL BISULFATE 75 MG: 75 TABLET ORAL at 08:13

## 2023-04-23 RX ADMIN — HEPARIN SODIUM 5000 UNITS: 5000 INJECTION INTRAVENOUS; SUBCUTANEOUS at 04:40

## 2023-04-24 PROCEDURE — 99233 SBSQ HOSP IP/OBS HIGH 50: CPT | Performed by: PHYSICAL MEDICINE & REHABILITATION

## 2023-04-24 PROCEDURE — 65270000032 HC RM SEMIPRIVATE

## 2023-04-24 PROCEDURE — 74011250637 HC RX REV CODE- 250/637: Performed by: INTERNAL MEDICINE

## 2023-04-24 RX ORDER — ACETAMINOPHEN 650 MG/1
650 SUPPOSITORY RECTAL
Status: DISCONTINUED | OUTPATIENT
Start: 2023-04-24 | End: 2023-04-25 | Stop reason: HOSPADM

## 2023-04-24 RX ORDER — MORPHINE SULFATE 20 MG/ML
10 SOLUTION ORAL
Status: DISCONTINUED | OUTPATIENT
Start: 2023-04-24 | End: 2023-04-25 | Stop reason: HOSPADM

## 2023-04-24 RX ORDER — LORAZEPAM 2 MG/ML
1 CONCENTRATE ORAL
Status: DISCONTINUED | OUTPATIENT
Start: 2023-04-24 | End: 2023-04-25 | Stop reason: HOSPADM

## 2023-04-24 RX ORDER — ACETAMINOPHEN 325 MG/1
650 TABLET ORAL
Status: DISCONTINUED | OUTPATIENT
Start: 2023-04-24 | End: 2023-04-25 | Stop reason: HOSPADM

## 2023-04-24 NOTE — PROGRESS NOTES
Problem: Pressure Injury - Risk of  Goal: *Prevention of pressure injury  Description: Document Marcellus Scale and appropriate interventions in the flowsheet. Outcome: Progressing Towards Goal  Note: Pressure Injury Interventions:  Sensory Interventions: Check visual cues for pain, Float heels, Keep linens dry and wrinkle-free    Moisture Interventions: Check for incontinence Q2 hours and as needed, Internal/External urinary devices    Activity Interventions: Pressure redistribution bed/mattress(bed type)    Mobility Interventions: Float heels, Pressure redistribution bed/mattress (bed type)    Nutrition Interventions: Document food/fluid/supplement intake    Friction and Shear Interventions: Minimize layers                Problem: Falls - Risk of  Goal: *Absence of Falls  Description: Document Armando Fall Risk and appropriate interventions in the flowsheet.   Outcome: Progressing Towards Goal  Note: Fall Risk Interventions:       Mentation Interventions: Adequate sleep, hydration, pain control, Bed/chair exit alarm, Door open when patient unattended, Evaluate medications/consider consulting pharmacy, Familiar objects from home, Gait belt with transfers/ambulation, Increase mobility, More frequent rounding, Reorient patient, Room close to nurse's station, Toileting rounds, Update white board    Medication Interventions: Bed/chair exit alarm, Evaluate medications/consider consulting pharmacy, Utilize gait belt for transfers/ambulation    Elimination Interventions: Bed/chair exit alarm, Stay With Me (per policy), Toilet paper/wipes in reach, Toileting schedule/hourly rounds    History of Falls Interventions: Bed/chair exit alarm, Consult care management for discharge planning, Door open when patient unattended, Evaluate medications/consider consulting pharmacy, Investigate reason for fall, Room close to nurse's station, Utilize gait belt for transfer/ambulation         Problem: Patient Education: Go to Patient Education Activity  Goal: Patient/Family Education  Outcome: Progressing Towards Goal

## 2023-04-24 NOTE — HOSPICE
[facility]  [phone]     Patient Name: Philipp Garcia  YOB: 1945  Age: 77 y.o. [facility] Note:  Hospice consult received, reviewing chart. Hospice meeting with pts wife Mandy Kulkarni has been arranged for 3 pm       Thank you for the opportunity to be of service to  and his . wife Mandy Kulkarni.     Marii Brown Formerly [facility], [address] [place] [address]  [phone]

## 2023-04-24 NOTE — PROGRESS NOTES
[facility]                                                                                          Hospitalist Progress Note  Yesica Ward MD  Answering service: [phone] OR [phone] from in house phone        Date of Service:  2023  NAME:  Yaya Abbott:  1945  MRN:  [id number]      Admission Summary:   65 y.o. 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 Rocco Hatchet. Patient was started on DAPT with loading dose and angio then aspirin 325 mg daily and clopidogrel 75 mg daily. Patient's wife made him a DNR on . Palliative medicine and neurology were consulted. Was unable to get an MRI brain due to incompatible  shunt. Did goal SBP was set at 100-1 60. Patient was continued on atorvastatin 80 mg daily. PT/OT/ST are recommending inpatient rehab. Patient is able to track with his eyes but is not following commands, continues to have right-sided hemiplegia and is aphasic. Patient failed swallow evaluation and currently has NG tube in place with tube feeds. TTE showed EF 65 to 70% and severely dilated left atrium. Patient has right upper extremity and bilateral lower extremity edema. He remains in two-point soft wrist restraints. Hospitalist service was consulted today for transfer of care and transfer out of ICU. Interval history / Subjective:   Patient was transitioned to comfort care yesterday and transferred out of ICU. He is essentially unresponsive this morning, in no acute distress. Daughter at the bedside, discussed with her, questions answered.        Assessment & Plan:        L MCA and GRACE strokes due to LICA occlusion  S/p mechanical thrombectomy, L carotid angioplasty and stent 4/16 by BARON Parks Sa  Patient with right hemiplegia and aphasia  Neurology following  Goal -160 mmHg  Unable to obtain MRI due to incompatible  shunt  Continue DAPT with aspirin 325 mg and clopidogrel 75 mg daily  Continue atorvastatin  P/OT/ST evaluations  Patient continues to fail swallow evaluations and will need permanent feeding solution.  - Currently n.p.o. with tube feeds via NG tube  Palliative medicine following  Family is considering transitioning to comfort care on Sunday, if no improvement in mental status. - 4/23: slight improvement in level of alertness, followed simple commands in the morning, then became less responsive in the afternoon;   - 4/24: transitioned to comfort care; unresponsive this morning;     H/o AAA s/p repair  Follows with vascular surgery     Dysphagia  Failed swallow eval  Currently NPO  TFs via NGT - stopped 4/23; Concern for aspiration:  - will prophylactically start IV Abx; patient had a change in condition with increased rhonchi at rhe right base by auscultation;   - 4/21: Dobbhoff placed, hold TFs tonight, IVFs overnight;   - aspiration precautions;   -4/22: Started IV antibiotics yesterday (Zosyn) for presumed aspiration pneumonia, hypoxia, and hypotension; continue for now; patient is on 12 L O2/min via nasal cannula, increased from 2 liters yesterday, at high risk of further deterioration;  - 4/23: saturating 100% on current settings, wean O2 as tolerated;   - Abx stopped 4/23, transitioned to comfort care; Code status: DNR, comfort measures only;   Prophylaxis: Heparin subcu  Care Plan discussed with: Patient's wife Eliana Mclaughlin RN; Anticipated Disposition: possibly home with hospice;  -anticipate discharge 4/25;           Hospital Problems  Date Reviewed: 4/16/2023            Codes Class Noted POA    Stroke (cerebrum) [facility]) ICD-10-CM: I63.9  ICD-9-CM: 434.91  4/16/2023 Unknown Review of Systems:   Review of systems not obtained due to patient factors. Vital Signs:    Last 24hrs VS reviewed since prior progress note. Most recent are:  Visit Vitals  /83 (BP 1 Location: Left upper arm, BP Patient Position: Lying)   Pulse 89   Temp 98.5 °F (36.9 °C)   Resp 20   Ht 6' (1.829 m)   Wt 99.3 kg (218 lb 14.7 oz)   SpO2 92%   BMI 29.69 kg/m²         Intake/Output Summary (Last 24 hours) at 4/24/2023 1159  Last data filed at 4/24/2023 0820  Gross per 24 hour   Intake 60 ml   Output 745 ml   Net -685 ml          Physical Examination:     I had a face to face encounter with this patient and independently examined them on 4/24/2023 as outlined below:    General: awake, NAD, aphasic, chronically ill appearing, unresponsive;  HEENT: ecchymosis/hematoma R eyebrow, NGT in place, NC in place, dry MM  Neck: supple, no masses  Lungs: coarse BS bilaterally   Rhonchi right base  On 12 liters O2/min  CVS: regular rhythm, normal rate, no m/r/g appreciated, RUE and BLE edema, +pulses  GI: hypoactive BS, soft, NT, ND   MSK: unable to assess due to patient's mental status  Neuro/Psych: awake, aphasic but tried to answer, Right side flaccid;  Skin: Warm, dry, no rashes or lesions noted           Data Review:    Review and/or order of clinical lab test  Review and/or order of tests in the radiology section of CPT  Review and/or order of tests in the medicine section of CPT      Labs:     Recent Labs     04/23/23 0234 04/22/23 0235   WBC 9.3 13.3*   HGB 9.8* 11.8*   HCT 30.7* 36.6    311       Recent Labs     04/23/23 0234 04/22/23 0235    138   K 4.3 4.6    103   CO2 29 26   BUN 32* 31*   CREA 1.04 1.00   * 117*   CA 8.4* 8.6   MG 2.1 2.1   PHOS 3.0 4.1       No results for input(s): ALT, AP, TBIL, TBILI, TP, ALB, GLOB, GGT, AML, LPSE in the last 72 hours.     No lab exists for component: SGOT, GPT, AMYP, HLPSE  No results for input(s): INR, PTP, APTT, INREXT, INREXT in the last 72 hours. No results for input(s): FE, TIBC, PSAT, FERR in the last 72 hours. No results found for: FOL, RBCF   No results for input(s): PH, PCO2, PO2 in the last 72 hours. No results for input(s): CPK, CKNDX, TROIQ in the last 72 hours.     No lab exists for component: CPKMB  Lab Results   Component Value Date/Time    Cholesterol, total 197 04/17/2023 02:33 AM    HDL Cholesterol 49 04/17/2023 02:33 AM    LDL, calculated 126.2 (H) 04/17/2023 02:33 AM    Triglyceride 109 04/17/2023 02:33 AM    CHOL/HDL Ratio 4.0 04/17/2023 02:33 AM     Lab Results   Component Value Date/Time    Glucose (POC) 183 (H) 12/08/2009 02:36 PM     Lab Results   Component Value Date/Time    Color Yellow 03/15/2022 03:20 PM    Appearance Cloudy (A) 03/15/2022 03:20 PM    pH (UA) 5.5 03/15/2022 03:20 PM    Ketone Trace (A) 03/15/2022 03:20 PM    Bilirubin Negative 03/15/2022 03:20 PM    Nitrites Negative 03/15/2022 03:20 PM    Leukocyte Esterase 2+ (A) 03/15/2022 03:20 PM    Bacteria None seen 03/15/2022 03:20 PM    WBC >30 (A) 03/15/2022 03:20 PM    RBC None seen 03/15/2022 03:20 PM         Medications Reviewed:     Current Facility-Administered Medications   Medication Dose Route Frequency    glycopyrrolate (ROBINUL) tablet 1 mg  1 mg Oral TID PRN    scopolamine (TRANSDERM-SCOP) 1 mg over 3 days 1 Patch  1 Patch TransDERmal Q72H    ondansetron (ZOFRAN) injection 4 mg  4 mg IntraVENous Q6H PRN    bisacodyL (DULCOLAX) suppository 10 mg  10 mg Rectal DAILY PRN     ______________________________________________________________________  EXPECTED LENGTH OF STAY: 4d 2h  ACTUAL LENGTH OF STAY:          8                 Yong Brink MD

## 2023-04-24 NOTE — HOSPICE
[facility]  [phone]    Hospice Nursing PRE-Admission   Discharge Summary  Patient Name: Vickie Box  YOB: 1945  Age: 77 y.o. Date of PLANNED Hospice Admission: 4/25/2023  Hospice Attending: Nani Snider  Primary Care Physician: Jazmyne Petty MD     Home Hospice Address:  [place] [id number]    Primary Contact and Phone:  Wife: Brenda Sanderson: [phone] Cell: [phone]  Daughter: Danna Olmedoer: Sharon       ADVANCE CARE PLANNING    Code Status: DNR  Durable DNR: [x]  Yes  []  No  Advance Care Planning 4/23/2023   Patient's Healthcare Decision Maker is: -   Primary Decision Maker Name -   Primary Decision Maker Relationship to Patient -   Confirm Advance Directive Yes, on file       [facility]: [address] [facility]: [facility] on [address]     Verbal CTI of terminal diagnosis with life expectancy of 6 months or less received from: Dr. Nani Snider    For the Hospice Diagnosis of: CVA    NCD: Declined Please review again on admission. CLINICAL INFORMATION   Allergies: No Known Allergies      Currently this patient has:  [x] Supplemental O2     [] PICC    [] PORT   [x] Guadalupe Catheter [] Ostomy  [] NG Tube  [] PEG Tube    [] Wounds       Does patient have an AICD device:  [] Yes     [x] No        COVID Screening: Last COVID test was 8/5/2020 per CC and was Negative. ASSESSMENT & PLAN     1. Symptom Issues Identified: Pt unable to communicate. Short of breath with movement. No PRN medications required at this time. Pt with secretions requiring intermittent suctioning. Pt is also unable to move his limbs post CVA. Suggest [employer] for home assistance. 2. Spiritual Issues  Identified: None. 3.  Psych/ Social/ Emotional Issues Identified: Pt lives alone with his wife, who works from home. Daughter plans to assist, and other family members will provide support.  Wife may be open to hiring additional caregivers. Likely will request Hospice Respite in the near future. CARE COORDINATION           Hospice Consents: Signed; DDNR and [facility] visitation guideline. All scanned. 2. DME Ordered/Company/Delivery Plan: Thank you for your order [id number]. It is scheduled to be Delivered by Mon Apr 24 05:00 PM - 09:00 PM EST. 1. 5L CONCENTRATOR   2. OXYGEN (O2) E TANK   3. E-TANKS GAS BACK-UP    4. ECART   5. STANDARD REGULATOR   6. O2 KIT Sale    7. SUCTION MACHINE CONTINUOUS   8. SUCTION KIT   9. STANDARD FULLY ELECTRIC BED FRAME W/FOAM MATTRESS   10. STANDARD ALTERNATING PRESSURE W/MICRO LEONOR MATTRESS    3. Unique home needs for safety: Bariatric patient  NO    4. Symptom Kit and other Medications Needs:   Request Processed  Confirmation #: [id number] 4/24/2023. TO be filled at [facility] and will arrange for  to deliver to home in the am, Tuesday    5. Home Admission Reservation: 4/25/2023 12:00    6. Transportation by:    Scheduled for 10:30 am    7. Verbal Report/Handoff given to: Hospice Team    8. Phone number to Hospital: [phone]    2. Supplies/Wound Care: Starter supplies will be sent home with patient.     Binu Polk RN, [place] [address] Nurse Liaison  [phone] Mobile  [phone] Office  Available on Perfect Serve

## 2023-04-24 NOTE — PROGRESS NOTES
Transition of Care Plan  RUR- Low 13%   DISPOSITION: The disposition plan is GIP vs. Home w/ Hospice vs. NF w/ [address]; Home Admittance 4/25 @12 pm; Per conversation with Angelia Wayne; Hospice Nurse     F/U with PCP/Specialist    Transport: [facility] 10:30am 4/25 Stretcher Cost: $256.05       POC: Spouse; Christina Prescott [phone]    Medicare pt has received, reviewed, and signed 2nd IM letter informing them of their right to appeal the discharge. Signed copy has been placed on pt bedside chart.     CM: [address]. MSW,   [phone]

## 2023-04-24 NOTE — PROGRESS NOTES
Palliative Medicine Consult  Many: [phone] ([phone])    Patient Name: Rayna Lyles  YOB: 1945    Date of Initial Consult: 04/24/23    Date of Today's Visit: 04/24/23  Reason for Consult: Care decisions- neuro event with change in quality of life  Requesting Provider: Robert Landin   Primary Care Physician: German Burger MD     SUMMARY:   Rayna Lyles is a 77 y.o. 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 With R sided weakness and aphasia, following some commands but as of 4/18 SLP recommending NPO, started on TFs.     4/21-Not managing secretions well. 4/24- Comfort measures only. Current medical issues leading to Palliative Medicine involvement include: care decisions. Wife brought in AMD and decision made for DNR status on 4/17/23. Social: Lives with wife Eliana Mclaughlin. They have one child- dtr Paula Sanchez who lives locally. Patient was the [address] [place] [address]. Then when he retired he enjoyed doing woodworking and making stained glass. However after his [address] he became less interactive/interested in things- stayed in bed often and did not leave the house much, but was able to do ADLs and fully alert/oriented. PALLIATIVE DIAGNOSES:   AMS/Lethargy although now able to follow some commands, open eyes at times  R sided weakness  Aphasia   Dysphagia with poor secretion management - improved w/ scopolamine patch  Goals of care/palliative care encounter       PLAN:   Meet w/ pt and dtr Paula Laura. Over weekend pt changed to comfort measures only and NGT removed/TFs stopped. Pt comfortable and managing secretions better w/ scopolamine patch and since TFs stopped.    Family feels that given his current status they can take care of him at home w/ Hospice's support. If sx worsen, would have the hospice house available to them. Comfort medications in place. Hospice to meet w/ family later today, could get pt home tmrw. Communicated plan of care with: Palliative IDT, Kenny Snellen Team incl Dr Sarah Valero RN w/ hospice     GOALS OF CARE / TREATMENT PREFERENCES:     GOALS OF CARE:  Patient/Health Care Proxy Stated Goals: Comfort    TREATMENT PREFERENCES:   Code Status: DNR    Patient and family's personal goals include: recovery as possible       Advance Care Planning:  [x] [facility] Interdisciplinary Team has updated the ACP Navigator with Health Care Decision Maker and Patient Capacity      Primary Decision Maker (Active): Kostas Villa - [phone]  Advance Care Planning 4/23/2023   Patient's Healthcare Decision Maker is: -   Primary Decision Maker Name -   Primary Decision Maker Relationship to Patient -   Confirm Advance Directive Yes, on file       Medical Interventions: Comfort measures       Other:    As far as possible, the palliative care team has discussed with patient / health care proxy about goals of care / treatment preferences for patient. HISTORY:     History obtained from: chart, staff, family     CHIEF COMPLAINT: Cannot obtain due to patient factors      HPI/SUBJECTIVE:    The patient is:   [] Verbal and participatory  [x] Non-participatory due to: medical condition     Pt opens eyes for a bit.      Clinical Pain Assessment (nonverbal scale for severity on nonverbal patients):   Clinical Pain Assessment  Severity: 0     Activity (Movement): Lying quietly, normal position    Duration: for how long has pt been experiencing pain (e.g., 2 days, 1 month, years)  Frequency: how often pain is an issue (e.g., several times per day, once every few days, constant)     FUNCTIONAL ASSESSMENT:     Palliative Performance Scale (PPS):  PPS: 20       PSYCHOSOCIAL/SPIRITUAL SCREENING:     Palliative IDT has assessed this patient for cultural preferences / practices and a referral made as appropriate to needs (Cultural Services, Patient Advocacy, Ethics, etc.)    Any spiritual / Hindu concerns:  [] Yes /  [x] No   If \"Yes\" to discuss with pastoral care during IDT     Does caregiver feel burdened by caring for their loved one:   [] Yes /  [x] No /  [] No Caregiver Present/Available [] No Caregiver [] Pt Lives at Facility  If \"Yes\" to discuss with social work during IDT    Anticipatory grief assessment:   [x] Normal  / [] Maladaptive     If \"Maladaptive\" to discuss with social work during IDT    ESAS Anxiety:      ESAS Depression:       Cannot obtain due to patient factors     REVIEW OF SYSTEMS:     Positive and pertinent negative findings in ROS are noted above in HPI. The following systems were [x] reviewed / [] unable to be reviewed as noted in HPI  Other findings are noted below. Systems: constitutional, ears/nose/mouth/throat, respiratory, gastrointestinal, genitourinary, musculoskeletal, integumentary, neurologic, psychiatric, endocrine. Positive findings noted below. Modified ESAS Completed by: provider   Fatigue: 10 Drowsiness: 9     Pain: 0           Dyspnea: 0     Constipation: No     Stool Occurrence(s): 1        PHYSICAL EXAM:     From RN flowsheet:  Wt Readings from Last 3 Encounters:   04/23/23 218 lb 14.7 oz (99.3 kg)   07/06/22 210 lb (95.3 kg)   06/09/22 210 lb (95.3 kg)     Blood pressure 133/83, pulse 89, temperature 98.5 °F (36.9 °C), resp. rate 20, height 6' (1.829 m), weight 218 lb 14.7 oz (99.3 kg), SpO2 92 %. Pain Scale 1: Adult Nonverbal Pain Scale  Pain Intensity 1: 0                     Constitutional: lethargic   ENMT: no nasal discharge, secretions managed.    Respiratory: breathing not labored  Musculoskeletal: no deformity, no tenderness to palpation  Skin: warm, dry  Neurologic: moves L side at times   Psychiatric: calm        HISTORY:     Active Problems:    Stroke (cerebrum) ([facility] [address] (4/16/2023)      Past Medical History:   Diagnosis Date    AAA (abdominal aortic aneurysm) without rupture (HCC)     BPH (benign prostatic hyperplasia)     Brain aneurysm 12/2009    able to coil & placed  shunt    Carotid stenosis     Gout     Hypercholesteremia 5/29/2018    Hypercholesterolemia     Vitamin D deficiency 1/18/2016      Past Surgical History:   Procedure Laterality Date    COLONOSCOPY N/A 08/11/2021    COLONOSCOPY performed by Aislinn Simms MD at [facility] ENDOSCOPY    HX CAROTID STENT Left 04/2023    HX COLONOSCOPY      HX CSF SHUNT  2009    HX HERNIA REPAIR      age 7yo    HX INTRACRANIAL ANEURYSM REPAIR  2009    coil    HX KNEE ARTHROSCOPY Right 2007    meniscus repaired    HX TONSILLECTOMY      age 8yo    IR PERC ART MECH THROMB INFUSION INTRACRANIAL  4/16/2023    [place] UNLISTED PROCEDURE VASCULAR SURGERY      AAA repair      Family History   Problem Relation Age of Onset    Alcohol abuse Mother     Heart Attack Father       History reviewed, no pertinent family history. Social History     Tobacco Use    Smoking status: Every Day     Types: Pipe    Smokeless tobacco: Never    Tobacco comments:     pipe smoker    Substance Use Topics    Alcohol use:  Yes     Alcohol/week: 4.0 standard drinks     Types: 4 Standard drinks or equivalent per week     Comment: just wine/one glass of wine daily     No Known Allergies   Current Facility-Administered Medications   Medication Dose Route Frequency    morphine (ROXANOL) 100 mg/5 mL (20 mg/mL) concentrated solution 10 mg  10 mg Oral Q1H PRN    LORazepam (INTENSOL) 2 mg/mL oral concentrate 1 mg  1 mg Oral Q1H PRN    acetaminophen (TYLENOL) tablet 650 mg  650 mg Oral Q4H PRN    Or    acetaminophen (TYLENOL) solution 650 mg  650 mg Oral Q4H PRN    Or    acetaminophen (TYLENOL) suppository 650 mg  650 mg Rectal Q4H PRN    glycopyrrolate (ROBINUL) tablet 1 mg  1 mg Oral TID PRN    scopolamine (TRANSDERM-SCOP) 1 mg over 3 days 1 Patch  1 Patch TransDERmal Q72H    ondansetron (ZOFRAN) injection 4 mg  4 mg IntraVENous Q6H PRN    bisacodyL (DULCOLAX) suppository 10 mg  10 mg Rectal DAILY PRN          LAB AND IMAGING FINDINGS:     Lab Results   Component Value Date/Time    WBC 9.3 04/23/2023 02:34 AM    HGB 9.8 (L) 04/23/2023 02:34 AM    PLATELET 219 79/44/0802 02:34 AM     Lab Results   Component Value Date/Time    Sodium 137 04/23/2023 02:34 AM    Potassium 4.3 04/23/2023 02:34 AM    Chloride 106 04/23/2023 02:34 AM    CO2 29 04/23/2023 02:34 AM    BUN 32 (H) 04/23/2023 02:34 AM    Creatinine 1.04 04/23/2023 02:34 AM    Calcium 8.4 (L) 04/23/2023 02:34 AM    Magnesium 2.1 04/23/2023 02:34 AM    Phosphorus 3.0 04/23/2023 02:34 AM      Lab Results   Component Value Date/Time    Alk. phosphatase 59 04/16/2023 10:06 AM    Protein, total 6.9 04/16/2023 10:06 AM    Albumin 3.3 (L) 04/16/2023 10:06 AM    Globulin 3.6 04/16/2023 10:06 AM     Lab Results   Component Value Date/Time    INR 1.0 04/16/2023 10:06 AM    Prothrombin time 10.9 04/16/2023 10:06 AM    aPTT 35.5 (H) 12/14/2017 11:52 AM      No results found for: IRON, FE, TIBC, IBCT, PSAT, FERR   No results found for: PH, PCO2, PO2  No components found for: GLPOC   No results found for: CPK, CKMB             Total time:   Counseling / coordination time, spent as noted above:   > 50% counseling / coordination?:     Prolonged service was provided for  []30 min   []75 min in face to face time in the presence of the patient, spent as noted above. Time Start:   Time End:   Note: this can only be billed with 90019 (initial) or 03870 (follow up). If multiple start / stop times, list each separately.

## 2023-04-25 ENCOUNTER — HOME CARE VISIT (OUTPATIENT)
Dept: HOSPICE | Facility: HOSPICE | Age: 78
End: 2023-04-25
Payer: MEDICARE

## 2023-04-25 VITALS
SYSTOLIC BLOOD PRESSURE: 135 MMHG | TEMPERATURE: 98 F | OXYGEN SATURATION: 92 % | RESPIRATION RATE: 20 BRPM | HEART RATE: 80 BPM | DIASTOLIC BLOOD PRESSURE: 82 MMHG

## 2023-04-25 VITALS
TEMPERATURE: 97.9 F | DIASTOLIC BLOOD PRESSURE: 87 MMHG | BODY MASS INDEX: 29.65 KG/M2 | RESPIRATION RATE: 16 BRPM | OXYGEN SATURATION: 93 % | HEART RATE: 83 BPM | SYSTOLIC BLOOD PRESSURE: 144 MMHG | HEIGHT: 72 IN | WEIGHT: 218.92 LBS

## 2023-04-25 PROCEDURE — HOSPICE MEDICATION HC HH HOSPICE MEDICATION

## 2023-04-25 PROCEDURE — G0299 HHS/HOSPICE OF RN EA 15 MIN: HCPCS

## 2023-04-25 PROCEDURE — 0651 HSPC ROUTINE HOME CARE

## 2023-04-25 RX ORDER — SCOLOPAMINE TRANSDERMAL SYSTEM 1 MG/1
1 PATCH, EXTENDED RELEASE TRANSDERMAL
Qty: 3 PATCH | Refills: 0 | Status: SHIPPED | OUTPATIENT
Start: 2023-04-27

## 2023-04-25 RX ORDER — MORPHINE SULFATE 20 MG/ML
10 SOLUTION ORAL
Qty: 30 ML | Refills: 0 | Status: SHIPPED | OUTPATIENT
Start: 2023-04-25 | End: 2023-05-02

## 2023-04-25 RX ORDER — LORAZEPAM 2 MG/ML
1 CONCENTRATE ORAL
Qty: 30 ML | Refills: 0 | Status: SHIPPED | OUTPATIENT
Start: 2023-04-25

## 2023-04-25 NOTE — HOSPICE
[facility]  [phone]    Hospice Liaison Nursing Note   Patient Name: Hraley Skinner  YOB: 1945  Age: 77 y.o. Chart reviewed for updates in plan of care. Plan: Discharge home this morning; Hospice home admission scheduled for today at 12:00. Please call Hospice team to offer support for patient, family or staff. Thank you for your coordination with the hospice plan of care. 9:50: Bedside patient. Pt eyes open, however, he does not track movement. Pt requiring oral suctioning. Supplies gathered and bedside for discharge. Call made to patient's wife, Vanessa Slaughter. Vanessa Slaughter confirmed DME arrived and ready for patient. Reviewed symptom medications are ready at [facility] pharmacy, and Hospice has arranged for a  to pick them up and deliver them to Vanessa Slaughter. Vanessa Slaughter states understanding and agreement. Visit Vitals  BP (!) 144/87 (BP 1 Location: Left upper arm, BP Patient Position: At rest)   Pulse 83   Temp 97.9 °F (36.6 °C)   Resp 16   Ht 6' (1.829 m)   Wt 99.3 kg (218 lb 14.7 oz)   SpO2 93%   BMI 29.69 kg/m²      Pt appears stable for transport. Noted patient does not have Guadalupe. Order for Guadalupe insertion prior to DC today.  Updated bedside nursing team.    Bradley Abbasi RN, [place] [id number] Nurse Liaison  [phone] Mobile  [phone] Office

## 2023-04-25 NOTE — PROGRESS NOTES
Physician Progress Note      PATIENT:               Kaycee Cortez  CSN #:                  [id number]  :                       1945  ADMIT DATE:       2023 9:51 AM  DISCH DATE:        2023 11:24 AM  RESPONDING  PROVIDER #:        Shahla Holland MD          QUERY TEXT:    Pt admitted with CVA. Pt noted to have coarse lungs and aspirated TF. His O2 requirements increased to 10L Hi Flow. If possible, please document in the progress notes and discharge summary if you are evaluating and/or treating any of the following: The medical record reflects the following:  Risk Factors: aspiration    Clinical Indicators:    starting -  6L NC 87-99% RR 17-22  10L Hi Flow 86-95% RR 22  12L Hi Flow % RR 14-21    Treatment: O2    Thank you,  Pauline Ba RN, BSN, CRCR, CCDS, SMART  Clinical Documentation Improvement  Eliotalin Jaimes. [email or web address]  [phone] or via Perfect Serve  Options provided:  -- Acute respiratory failure with hypoxia  -- Other - I will add my own diagnosis  -- Disagree - Not applicable / Not valid  -- Disagree - Clinically unable to determine / Unknown  -- Refer to Clinical Documentation Reviewer    PROVIDER RESPONSE TEXT:    This patient is in acute respiratory failure with hypoxia.     Query created by: Feliberto Murdock on 2023 1:09 PM      Electronically signed by:  Shahla Holland MD 2023 2:36 PM

## 2023-04-25 NOTE — PROGRESS NOTES
Problem: Pressure Injury - Risk of  Goal: *Prevention of pressure injury  Description: Document Marcellus Scale and appropriate interventions in the flowsheet. Outcome: Resolved/Met  Note: Pressure Injury Interventions:  Sensory Interventions: Avoid rigorous massage over bony prominences    Moisture Interventions: Check for incontinence Q2 hours and as needed    Activity Interventions: Assess need for specialty bed    Mobility Interventions: Assess need for specialty bed    Nutrition Interventions: Discuss nutritional consult with provider    Friction and Shear Interventions: Apply protective barrier, creams and emollients                Problem: Patient Education: Go to Patient Education Activity  Goal: Patient/Family Education  Outcome: Resolved/Met     Problem: Falls - Risk of  Goal: *Absence of Falls  Description: Document Armando Fall Risk and appropriate interventions in the flowsheet.   Outcome: Resolved/Met  Note: Fall Risk Interventions:       Mentation Interventions: Adequate sleep, hydration, pain control, Bed/chair exit alarm, Door open when patient unattended, Evaluate medications/consider consulting pharmacy, Familiar objects from home, Gait belt with transfers/ambulation, Increase mobility, More frequent rounding, Reorient patient, Room close to nurse's station, Toileting rounds, Update white board    Medication Interventions: Bed/chair exit alarm, Evaluate medications/consider consulting pharmacy, Utilize gait belt for transfers/ambulation    Elimination Interventions: Bed/chair exit alarm, Stay With Me (per policy), Toilet paper/wipes in reach, Toileting schedule/hourly rounds    History of Falls Interventions: Bed/chair exit alarm, Consult care management for discharge planning, Door open when patient unattended, Evaluate medications/consider consulting pharmacy, Investigate reason for fall, Room close to nurse's station, Utilize gait belt for transfer/ambulation         Problem: Patient Education: Go to Patient Education Activity  Goal: Patient/Family Education  Outcome: Resolved/Met     Problem: Nutrition Deficit  Goal: *Optimize nutritional status  Outcome: Resolved/Met     Problem: Patient Education: Go to Patient Education Activity  Goal: Patient/Family Education  Outcome: Resolved/Met     Problem: Patient Education: Go to Patient Education Activity  Goal: Patient/Family Education  Outcome: Resolved/Met     Problem: Patient Education: Go to Patient Education Activity  Goal: Patient/Family Education  Outcome: Resolved/Met

## 2023-04-25 NOTE — PROGRESS NOTES
Problem: Pressure Injury - Risk of  Goal: *Prevention of pressure injury  Description: Document Marcellus Scale and appropriate interventions in the flowsheet. Outcome: Progressing Towards Goal  Note: Pressure Injury Interventions:  Sensory Interventions: Avoid rigorous massage over bony prominences    Moisture Interventions: Check for incontinence Q2 hours and as needed    Activity Interventions: Assess need for specialty bed    Mobility Interventions: Assess need for specialty bed    Nutrition Interventions: Discuss nutritional consult with provider    Friction and Shear Interventions: Apply protective barrier, creams and emollients, HOB 30 degrees or less     Problem: Falls - Risk of  Goal: *Absence of Falls  Description: Document Armando Fall Risk and appropriate interventions in the flowsheet.   Outcome: Progressing Towards Goal  Note: Fall Risk Interventions:       Mentation Interventions: Adequate sleep, hydration, pain control, Bed/chair exit alarm, Door open when patient unattended, Evaluate medications/consider consulting pharmacy, Familiar objects from home, Gait belt with transfers/ambulation, Increase mobility, More frequent rounding, Reorient patient, Room close to nurse's station, Toileting rounds, Update white board    Medication Interventions: Bed/chair exit alarm, Evaluate medications/consider consulting pharmacy, Utilize gait belt for transfers/ambulation    Elimination Interventions: Bed/chair exit alarm, Stay With Me (per policy), Toilet paper/wipes in reach, Toileting schedule/hourly rounds    History of Falls Interventions: Bed/chair exit alarm, Consult care management for discharge planning, Door open when patient unattended, Evaluate medications/consider consulting pharmacy, Investigate reason for fall, Room close to nurse's station, Utilize gait belt for transfer/ambulation  Problem: Nutrition Deficit  Goal: *Optimize nutritional status  Outcome: Progressing Towards Goal     Problem: Patient Education: Go to Patient Education Activity  Goal: Patient/Family Education  Outcome: Progressing Towards Goal     Problem: Patient Education: Go to Patient Education Activity  Goal: Patient/Family Education  Outcome: Progressing Towards Goal     Problem: Patient Education: Go to Patient Education Activity  Goal: Patient/Family Education  Outcome: Progressing Towards Goal

## 2023-04-25 NOTE — PROGRESS NOTES
I have reviewed discharge instructions with the patient. AVS was signed with second nurse because patient is unable to sign. Hospice Nurse Pa Riley was present. Patient will be sent home on hospice and are covering.

## 2023-04-25 NOTE — HOSPICE
Alexey White  1945  78     Principle Hospice Diagnosis: CVA  Diagnoses RELATED to the terminal prognosis: Aphasia, Dysphagia, Right Hemiparesis  Unrelated Diagnosis: Hypertension, SAH due to Raptured aneurysm s/p coiling and  shunt, abdominal aortic aneurysm, and BPH     Date of Hospice Admission: 2023  Hospice Attending Elected by Patient: DR. Megan Nesbitt  Primary Care Physician:      Admitting RN: John Santiago CM: Dena Bailey  : Ivis Fat: Angeles Miranda DNR: Yes     Service: Not discussed      Home: TBD     Direct Observation: On arrival patient in hospital bed with wife Deepika Austin and daughter Maximino Sterling at the bedside. Patient opens eyes, appears restless and showing nonverbal signs of pain while personal care being provided. Patient on 3L/min of oxygen via nasal cannula, no signs of dyspnea. Lung sounds coarse with weak congested cough. Patient producing moderate thin sputum, suction in the home family educated on how to use. Patient has mild nonpitting edema to right arm. Skin is intact with some bruising noted to bilateral arms and around right eye. Discussed non-verbal signs of pain and how to medicate with family. Family provided education how to provide personal care including rojas care. Reviewed hospice plan of care including medications and how to administer with family who voiced understanding. All comfort medications in the home, Scopolamine patch ordered from [facility]. Family encouraged to call hospice with any questions or concerns. On departure patient sleeping, comfortable.               Palliative Performance Scale: 10%  ER Visits/ Hospitalizations in past year: 1  Onset Date of Hospice Diagnosis: 2023    Summary of Disease Progression Leading to Hospice Diagnosis:  Excerpt from Dr. David Tinajero note from 23   Date of Today's Visit: 23  Reason for Consult: Care decisions- neuro event with change in quality of life  Requesting Provider: Christine Wallace   Primary Care Physician: Masters, Siobhan Harp MD     SUMMARY:  Brandon Sorto is a 77 y.o. 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 With R sided weakness and aphasia, following some commands but as of 4/18 SLP recommending NPO, started on TFs.      4/21-Not managing secretions well. 4/24- Comfort measures only. Current medical issues leading to Palliative Medicine involvement include: care decisions. Wife brought in AMD and decision made for DNR status on 4/17/23. Social: Lives with wife Jamel Barros. They have one child- dtr Micheal Zendejas who lives locally. Patient was the [address] [place] [address]. Then when he retired he enjoyed doing woodworking and making stained glass. However after his [address] he became less interactive/interested in things- stayed in bed often and did not leave the house much, but was able to do ADLs and fully alert/oriented. PALLIATIVE DIAGNOSES:  1. AMS/Lethargy although now able to follow some commands, open eyes at times  2. R sided weakness  3. Aphasia   4. Dysphagia with poor secretion management - improved w/ scopolamine patch  5. Goals of care/palliative care encounter        PLAN:  1. Meet w/ pt and dtr Micheal Zendejas. Over weekend pt changed to comfort measures only and NGT removed/TFs stopped. 2. Pt comfortable and managing secretions better w/ scopolamine patch and since TFs stopped. 3. Family feels that given his current status they can take care of him at home w/ Hospice's support. If sx worsen, would have the hospice house available to them. 4. Comfort medications in place. 5. Hospice to meet w/ family later today, could get pt home tmrw.    6. Communicated plan of care with: Palliative IDT, Maria Fernanda Fu Team incl Dr Richard Mcfadden RN w/ hospice     GOALS OF CARE / TREATMENT PREFERENCES:     GOALS OF CARE:  Patient/Health Care Proxy Stated Goals: Comfort     TREATMENT PREFERENCES:   Code Status: DNR       Use LCD Guidelines and list features:   Stroke:  ____x____  1. Karnofsky Performance Status (KPS) or Palliative Performance Scale (PPS) of 40% or less;  ____x____  2. Inability to maintain hydration and caloric intake with one of the following:        ___x_____  a. Weight loss >10% in the last 6 months or >7.5% in the last 3 months;        ________  b. Serum albumin         ____x____  c. Current history of pulmonary aspiration not responsive to speech language                                 pathology intervention;                       ________  d. Sequential calorie counts documenting inadequate caloric/fluid intake;                       ___x_  e. Dysphagia severe enough to prevent the patient from receiving food and fluids                   necessary to sustain life, in a patient who declines or does not receive artificial                                 nutrition and hydration. SPIRITUAL/Social/Emotional/psych:     Dr. Abner Boyd contacted, agrees to serve as attending provider for hospice and provided verbal certification of terminal illness with prognosis of 6 months or less life expectancy. Orders for hospice admission, medications and plan of treatment received. Medication reconciliation completed. Currently this patient has:  Supplemental O2: 3L/min  Guadalupe: 16Fr.   Suction        MEDS:  I have reviewed the patient's medication list with MD and identified the following:  Nonformulary medications: n/a  Unrelated medications: n/a     IDT communication to include MD, SN, SW, CH and support team.

## 2023-04-25 NOTE — PALLIATIVE CARE DISCHARGE
The Palliative Medicine team was consulted as part of your / your loved one's care in the hospital. Our team is a supportive service; we strive to relieve suffering and improve quality of life. You identified the following goal(s) as your main focus for healthcare: We discussed your overall medical condition and goal is to focus on comfort and quality of life with hospice care. You will discharge home with support of [facility]. We reviewed advance care planning information, which includes the following: You have a Durable Do Not Resuscitate Order in place, which should travel with you. When you are in a facility, this form should be placed on your chart. Once you are home, it is recommended that the [facility] form be placed in a visible location such as on the refrigerator or bedroom door. We reviewed / discussed your code status as: DNR     Full Code means perform CPR in the event of cardiac arrest     [facility] means do NOT perform CPR in the event of cardiac arrest     Partial Code means you have specific preferences, please discuss with your health care team     No Order means this issue was not addressed / resolved during your stay    Because of the importance of this information, we are providing you with a printed copy to share with other healthcare providers after this hospitalization is complete. If any of the above information is incomplete or incorrect, please contact the Palliative Medicine team at [phone].

## 2023-04-25 NOTE — DISCHARGE SUMMARY
Physician Discharge Summary     Patient ID:    Rayna Lyles  [id number]  :  1945    Admit date: 2023    Discharge date and time: 2023    DISCHARGE CONDITION: Serious    Hospital Diagnoses and Treatment Rendered:   HPI:  65 y.o. 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 He underwent emergent mechanical thrombectomy, carotid angioplasty and stenting by BARON Parks Sa. Patient was started on DAPT with loading dose and angio, then aspirin 325 mg daily and clopidogrel 75 mg daily, in addition to a statin. Palliative medicine and neurology were consulted. HOSPITAL COURSE:  L MCA and GRACE strokes due to LICA occlusion  S/p mechanical thrombectomy, L carotid angioplasty and stent  by BARON Parks Sa  Patient with right hemiplegia and aphasia  Neurology consulted  Goal -160 mmHg  Unable to obtain MRI due to incompatible  shunt  Continue DAPT with aspirin 325 mg and clopidogrel 75 mg daily  Continue atorvastatin  P/OT/ST evaluations  Patient failed multiple swallow evaluations;   NGT was started temporarily for tube feeds; Had an aspiration event   Per patient wishes/wife: no PEG tube  Negative medicine consulted; Was made DNR on ; Transitioned to comfort care on ; Discharged to home with hospice on ;    H/o AAA s/p repair;     Dysphagia:  Failed swallow eval  No PEG tube;  TFs via NGT - stopped ;       Concern for aspiration:  - will prophylactically start IV Abx; patient had a change in condition with increased rhonchi at rhe right base by auscultation;   - : Dobbhoff placed, hold TFs tonight, IVFs overnight;   - aspiration precautions;   - : Started IV antibiotics yesterday (Zosyn) for presumed aspiration pneumonia, hypoxia, and hypotension; continue for now; patient is on 12 L O2/min via nasal cannula, increased from 2 liters yesterday, at high risk of further deterioration;  - 4/23: saturating 100% on current settings, wean O2 as tolerated;   - Abx stopped 4/23, transitioned to comfort care;   -Scopolamine patch to control secretions and as needed Robinul;    Patient was discharged to home with hospice per family's wishes;         Chronic Diagnoses:    Problem List as of 4/25/2023 Date Reviewed: 4/16/2023            Codes Class Noted - Resolved    Stroke (cerebrum) ([facility] ICD-10-CM: I63.9  ICD-9-CM: 434.91  4/16/2023 - Present        Hypercholesteremia ICD-10-CM: E78.00  ICD-9-CM: 272.0  5/29/2018 - Present        Short-term memory loss ICD-10-CM: R41.3  ICD-9-CM: 780.93  1/16/2018 - Present        AAA (abdominal aortic aneurysm) ([facility] ICD-10-CM: I71.40  ICD-9-CM: 441.4  12/21/2017 - Present        Abdominal aortic aneurysm (AAA) without rupture ([facility] ICD-10-CM: I71.40  ICD-9-CM: 441.4  5/24/2017 - Present        Gout ICD-10-CM: M10.9  ICD-9-CM: 274.9  Unknown - Present        BPH (benign prostatic hyperplasia) ICD-10-CM: N40.0  ICD-9-CM: 600.00  Unknown - Present        Brain aneurysm ICD-10-CM: I67.1  ICD-9-CM: 437.3  Unknown - Present        Vitamin D deficiency ICD-10-CM: E55.9  ICD-9-CM: 268.9  1/18/2016 - Present           Discharge Medications:     Discharge Medication List as of 4/25/2023  9:50 AM        START taking these medications    Details   LORazepam (INTENSOL) 2 mg/mL concentrated solution Take 0.5 mL by mouth every three (3) hours as needed for Restlessness or Anxiety. Max Daily Amount: 8 mg., Normal, Disp-30 mL, R-0      morphine (ROXANOL) 100 mg/5 mL (20 mg/mL) concentrated solution Take 0.5 mL by mouth every three (3) hours as needed for Pain or Shortness of Breath for up to 7 days.  Max Daily Amount: 80 mg., Normal, Disp-30 mL, R-0      scopolamine (TRANSDERM-SCOP) 1 mg over 3 days pt3d 1 Patch by TransDERmal route every seventy-two (72) hours. , Normal, Disp-3 Patch, R-0           STOP taking these medications       cholecalciferol, vitamin D3, (Vitamin D3) 50 mcg (2,000 unit) tab Comments:   Reason for Stopping:         donepeziL (ARICEPT) 10 mg tablet Comments:   Reason for Stopping:         rosuvastatin (CRESTOR) 20 mg tablet Comments:   Reason for Stopping:         allopurinoL (ZYLOPRIM) 300 mg tablet Comments:   Reason for Stopping:         ASPIRIN LOW DOSE PO Comments:   Reason for Stopping: Follow up Care:    1. Viet Tovar MD in 1-2 weeks. Please call to set up an appointment shortly after discharge. Diet:  Comfort feeding    Disposition:  Home with Hospice; Advanced Directive:   FULL    DNR x     Discharge Exam:  I had a face to face encounter with this patient and independently examined them on 4/25/2023 as outlined below:     General: awake, NAD, aphasic, chronically ill appearing, unresponsive;  HEENT: ecchymosis/hematoma R eyebrow, NGT in place, NC in place, dry MM  Neck: supple, no masses  Lungs: coarse BS bilaterally   Rhonchi right base  On 12 liters O2/min  CVS: regular rhythm, normal rate, no m/r/g appreciated, RUE and BLE edema, +pulses  GI: hypoactive BS, soft, NT, ND   MSK: unable to assess due to patient's mental status  Neuro/Psych: awake, aphasic but tried to answer, Right side flaccid;  Skin: Warm, dry, no rashes or lesions noted    CONSULTATIONS: Pulmonary/Intensive care, Neurology, Palliative Care, Hospice, and neuro interventional surgery;    Significant Diagnostic Studies:   4/16/2023: BUN 22 MG/DL (H; Ref range: 6 - 20 MG/DL); Calcium 9.3 MG/DL (Ref range: 8.5 - 10.1 MG/DL); CO2 28 mmol/L (Ref range: 21 - 32 mmol/L); Creatinine 1.27 MG/DL (Ref range: 0.70 - 1.30 MG/DL); Glucose 127 mg/dL (H; Ref range: 65 - 100 mg/dL); HCT 40.5 % (Ref range: 36.6 - 50.3 %); HGB 13.2 g/dL (Ref range: 12.1 - 17.0 g/dL); Potassium 4.7 mmol/L (Ref range: 3.5 - 5.1 mmol/L);  Sodium 138 mmol/L (Ref range: 136 - 145 mmol/L)  4/17/2023: BUN 20 MG/DL (Ref range: 6 - 20 MG/DL); Calcium 8.8 MG/DL (Ref range: 8.5 - 10.1 MG/DL); CO2 25 mmol/L (Ref range: 21 - 32 mmol/L); Creatinine 1.09 MG/DL (Ref range: 0.70 - 1.30 MG/DL); Glucose 163 mg/dL (H; Ref range: 65 - 100 mg/dL); HCT 37.7 % (Ref range: 36.6 - 50.3 %); HGB 12.1 g/dL (Ref range: 12.1 - 17.0 g/dL); Potassium 4.3 mmol/L (Ref range: 3.5 - 5.1 mmol/L); Sodium 137 mmol/L (Ref range: 136 - 145 mmol/L)  Recent Labs     04/23/23  0234   WBC 9.3   HGB 9.8*   HCT 30.7*        Recent Labs     04/23/23  0234      K 4.3      CO2 29   BUN 32*   CREA 1.04   *   CA 8.4*   MG 2.1   PHOS 3.0     No results for input(s): AP, TBIL, TP, ALB, GLOB, GGT, AML, LPSE in the last 72 hours. No lab exists for component: SGOT, GPT, AMYP, HLPSE  No results for input(s): INR, PTP, APTT, INREXT in the last 72 hours. No results for input(s): FE, TIBC, PSAT, FERR in the last 72 hours. No results for input(s): PH, PCO2, PO2 in the last 72 hours. No results for input(s): CPK, CKMB in the last 72 hours.     No lab exists for component: TROPONINI  No components found for: [place]      Discharge time: >35 minutes;     Signed:  Jose Luis Whitman MD  4/25/2023  5:37 PM   .

## 2023-04-26 ENCOUNTER — HOME CARE VISIT (OUTPATIENT)
Dept: SCHEDULING | Facility: HOME HEALTH | Age: 78
End: 2023-04-26
Payer: MEDICARE

## 2023-04-26 VITALS
HEART RATE: 86 BPM | DIASTOLIC BLOOD PRESSURE: 72 MMHG | RESPIRATION RATE: 20 BRPM | OXYGEN SATURATION: 96 % | SYSTOLIC BLOOD PRESSURE: 118 MMHG

## 2023-04-26 PROCEDURE — 0651 HSPC ROUTINE HOME CARE

## 2023-04-26 PROCEDURE — G0156 HHCP-SVS OF AIDE,EA 15 MIN: HCPCS

## 2023-04-26 PROCEDURE — G0299 HHS/HOSPICE OF RN EA 15 MIN: HCPCS

## 2023-04-27 ENCOUNTER — HOME CARE VISIT (OUTPATIENT)
Dept: HOSPICE | Facility: HOSPICE | Age: 78
End: 2023-04-27
Payer: MEDICARE

## 2023-04-27 ENCOUNTER — HOME CARE VISIT (OUTPATIENT)
Dept: SCHEDULING | Facility: HOME HEALTH | Age: 78
End: 2023-04-27
Payer: MEDICARE

## 2023-04-27 VITALS
HEART RATE: 83 BPM | OXYGEN SATURATION: 93 % | DIASTOLIC BLOOD PRESSURE: 80 MMHG | RESPIRATION RATE: 20 BRPM | SYSTOLIC BLOOD PRESSURE: 132 MMHG

## 2023-04-27 PROCEDURE — G0299 HHS/HOSPICE OF RN EA 15 MIN: HCPCS

## 2023-04-27 PROCEDURE — 0651 HSPC ROUTINE HOME CARE

## 2023-04-27 NOTE — HOSPICE
Routine visit pt new on service and anticipated to decline quickly due to terminal diagnosis. Pts spouse Demetrius Garcia and dtr Gema Santiago are present and providing care. Pt has not needed any symptom management medication and his scopolamine patch will be changed today and has been very effective in managing his secretions. Spouse found pt without oxygen on during the night he had pulled it off and she was worried but is reassured he is breathing well and it is ok to take a break from the oxygen. Spouse is fearful of giving medicaiton and [employer] when she is there. Pt appears comfortable and content, family will call if there are any questions or concerns and pt will receive daily visits.

## 2023-04-27 NOTE — HOSPICE
Initial RNCM visit. Spouse Alexandra Murdock answered the door and allowed this writer in. Jcarlos Todd is present and providing a bedbath. Pts daughter Jose L Peetrs also present. Pt is drowsy but tracking with eyes at times. Attempts to speak but not understood. Pt with dried blood in nares that is cleansed by HHA and this writer cleansed nasal cannula and replaced on pt. Thick secretions are coughed up and pt able to clear. Better with use of scopolamine patch. Suction available and HHA did extensive mouth care. Oxygen reviewed. Provided a printed medication list and reviewed with Jose L Peters she will most likely administer medications. Plan to provide daily support. Provided a copy of \"[place]\". Alexandra Murdock and Jose L Peters are encouraged to call at any time with questions or concerns.

## 2023-04-28 ENCOUNTER — HOME CARE VISIT (OUTPATIENT)
Dept: SCHEDULING | Facility: HOME HEALTH | Age: 78
End: 2023-04-28
Payer: MEDICARE

## 2023-04-28 ENCOUNTER — HOME CARE VISIT (OUTPATIENT)
Dept: HOSPICE | Facility: HOSPICE | Age: 78
End: 2023-04-28
Payer: MEDICARE

## 2023-04-28 VITALS
HEART RATE: 63 BPM | SYSTOLIC BLOOD PRESSURE: 134 MMHG | DIASTOLIC BLOOD PRESSURE: 81 MMHG | TEMPERATURE: 97.1 F | RESPIRATION RATE: 16 BRPM | OXYGEN SATURATION: 91 %

## 2023-04-28 PROCEDURE — 0651 HSPC ROUTINE HOME CARE

## 2023-04-28 PROCEDURE — G0300 HHS/HOSPICE OF LPN EA 15 MIN: HCPCS

## 2023-04-28 PROCEDURE — G0156 HHCP-SVS OF AIDE,EA 15 MIN: HCPCS

## 2023-04-28 NOTE — HOSPICE
0-7.  On arrival patient laying in bed lethargic but well palliated. Patient opened eyes to name. Patient did not appear to have pain. Wife Lobo Turner and daughter Edgard Thacker present during visit. Per Lobo Turner patient was agitated last evening. She gave a total of 2 doses of lorazepam then administered haldol with good response. Discussed terminal agitation and decline; verbalized understanding. Plan is to give lorazepam about every 3 hours to keep patient well palliated. Patient is running low on mouth swabs. Will have visiting nurse bring tomorrow.

## 2023-04-29 ENCOUNTER — HOME CARE VISIT (OUTPATIENT)
Dept: SCHEDULING | Facility: HOME HEALTH | Age: 78
End: 2023-04-29
Payer: MEDICARE

## 2023-04-29 VITALS
SYSTOLIC BLOOD PRESSURE: 122 MMHG | RESPIRATION RATE: 16 BRPM | HEART RATE: 78 BPM | DIASTOLIC BLOOD PRESSURE: 78 MMHG | OXYGEN SATURATION: 91 % | TEMPERATURE: 97.3 F

## 2023-04-29 PROCEDURE — G0299 HHS/HOSPICE OF RN EA 15 MIN: HCPCS

## 2023-04-29 PROCEDURE — 0651 HSPC ROUTINE HOME CARE

## 2023-04-29 NOTE — HOSPICE
pt lying in bed with resp unlabored. pt grimmaced when turned, medicated with morphine 5 mg and lorazepam 0.5 mg SL. congested cough when turned, non productive. catheter leaking, removed, minimal amount of zhanna blood on tubing. reinserted [place] 16F without difficulty, draining scant amount of urine. mouth care given, pt bit down and swallowed water from sponge. writer gave pt water through syringe. pt swallowed and requested more. after 10 mls, pt refused more. wife asked if she could give him water and writer demonstrated using 1 ml syringe. instructed to have [address] up when giving water and watch for him to swallow. verbalized understanding. pt repositioned with pillow on left side. instructed wife in repositioning pt every 4-6 hours during the day.   reminded wife to call hospice for any needs

## 2023-04-30 ENCOUNTER — HOME CARE VISIT (OUTPATIENT)
Dept: HOSPICE | Facility: HOSPICE | Age: 78
End: 2023-04-30
Payer: MEDICARE

## 2023-04-30 VITALS — HEART RATE: 71 BPM | DIASTOLIC BLOOD PRESSURE: 82 MMHG | SYSTOLIC BLOOD PRESSURE: 112 MMHG | RESPIRATION RATE: 8 BRPM

## 2023-04-30 PROCEDURE — 0651 HSPC ROUTINE HOME CARE

## 2023-04-30 PROCEDURE — G0299 HHS/HOSPICE OF RN EA 15 MIN: HCPCS

## 2023-05-01 PROCEDURE — 0651 HSPC ROUTINE HOME CARE

## 2023-05-01 NOTE — HOSPICE
Routine visit pt is nearing end of life. Pt sleeping on arrival but did wake when being assessed. Makes eye contact and raises eyebrows during conversation. Attempted to speak but is not understood. Pt had catheter replaced over the weekend and it appears pt had leakage afterwards or was pulling on catheter. He has old dry urine on brief and right groin crease. Blood noted in the dried urine. HHA present for bathing and assisted with catheter care. Catheter is draining tea colored urine and spouse had emptied bag and there is 200 cc in bag at time of visit. Advised to monitor for lack of drainage and report if pt has large leakage from catheter again. Spouse verbalizes understanding. Pt with moist cough when rolled for care. Dried blood removed from narea and nasal cannula cleaned. Noted to have apnea 10 seconds. Mottling of extremities seen when pt was rolled side to side for care. Right hemiparesis and right sided facial droop. Legs are rigid. Spouse has found a small smooth wooden cross for pt to hold to keep him from pulling catheter. She did report during night several nights ago pt got his covers off. Pt was premedicated with morphine 10 mg for anticipated hygiene care and bathing. He still had discomfort when rolled and it moved mucous and created coughing. Resolved with positioning and rest.  Continue daily visits for support at end of life.

## 2023-05-02 PROCEDURE — 0651 HSPC ROUTINE HOME CARE

## 2023-05-03 PROCEDURE — 0651 HSPC ROUTINE HOME CARE

## 2023-05-03 NOTE — HOSPICE
Patient resting without signs of pain, wife reported last medicating last night for restlessness. Patient with eyes open,no tracking of nurse movement. Patient noted with periods of apna, lasting about10 second during visit. Wife reported noting periods of apnea. Reviewed medications for symptom management, wife verbalize understanding. Reviewed none verbal signs of discomfort.  Encourage caregiver to call with any questions or concerns

## 2023-05-03 NOTE — HOSPICE
Routine visit pt is starting to actively transition. He is semiresponsive to pain. Breathing is in [address] pattern with 10-15 seconds of apnea. Oxygen saturation 84-87% on 2.5 L of oxygen. Moist cough noted after medication administration. Feet are cold and mottled. Skin otherwise feels warm. Pt with head leaning to left side and even after it is positioned and propped with a pillow returned to left side. Indwelling collado with cliff urine draining in lesser amount with no leakage seen. Mouth care provided. End of life education with changes seen. Open discussion with spouse Rafi Kaba and dtr Nathalie Gomez and they do not want pt to suffer in any way. Recommended using morphine and lorazepam every 4 hours for actively dying phase and Ok received from Chantelle Dukes NP. Tachycardia and arrhythmia present. Catheter care and repositioning and pt medicated and appearing very comfortable. Rafi Kaba will call if pt passes and understands what will happen at that time.  offered and declined.

## 2023-05-04 PROCEDURE — 0651 HSPC ROUTINE HOME CARE

## 2023-05-04 NOTE — HOSPICE
Daily support visit. Pt is in the active dying phase. Spouse Namrata Gonzalez is present and calm and supportive. She is giving morphine and lorazepam every 4 hours as suggested. Pt resting in hospital bed with head elevated 45 degrees. Head turned to the left which is his preference. He is well supported with pillows. He is pale and dusky iwth mottling of knees and feet and feet are cold. Palms of hands mottled and nailbeds are cyanotic. Unable to get VS readings today. Oxygen in use, lungs with coarse rhonchi in all lobes. Scopolamine patch changed by this writer and educated on use of hyoscyamine for secretions if they become problematic. Namrata Gonzalez verbalizes understanding. Catheter care provided. Urine output is scant with minimal increase in volume in bag and has not been emptied x 48 hours. Respirations cheynne lua pattern with apnea now 15-25 seconds and episodes of apnea more frequent. All questions and concerns addressed. [facility] service. Supported in her care she is providing to her . She is understanding to call with concerns and when pt passes.

## 2023-05-05 PROCEDURE — 0651 HSPC ROUTINE HOME CARE

## 2023-05-05 NOTE — HOSPICE
Mr. Gerard Gallegos passed at home today with wife Kirill Johnson and daughter Irene Skiff at the bedside. Patient pronounced after no apical pulse and Breath sounds on auscultation for a full minute. Time of death [phone], postmortem care provided, collado removed. [facility]  home notified. Mediations wasted per hospice protocol with Kirill Johnson serving as a witness. Family grieving appropriately declines visit from [address] [place] [address] Road. Chris notified to  DME this evening.   Dr. Garret Rios notified via this email

## 2023-05-06 ENCOUNTER — HOME CARE VISIT (OUTPATIENT)
Facility: HOME HEALTH | Age: 78
End: 2023-05-06
Payer: MEDICARE

## 2023-05-06 PROCEDURE — 0651 HSPC ROUTINE HOME CARE

## 2023-05-07 PROCEDURE — 0651 HSPC ROUTINE HOME CARE

## 2023-07-03 NOTE — H&P
[address]  [facility], [address]      History and Physical       NAME:  Omari Valencia   :   1945   MRN:   [id number]             History of Present Illness:  Patient is a 67 y.o. who is seen for diarrhea. PMH:  Past Medical History:   Diagnosis Date    AAA (abdominal aortic aneurysm) without rupture (HCC)     BPH (benign prostatic hyperplasia)     Brain aneurysm 2009    able to coil & placed  shunt    Carotid stenosis     Gout     Hypercholesteremia 2018    Hypercholesterolemia     Vitamin D deficiency 2016       PSH:  Past Surgical History:   Procedure Laterality Date    HX COLONOSCOPY      HX CSF SHUNT  2009    HX HERNIA REPAIR      age 9yo   [place] INTRACRANIAL ANEURYSM REPAIR  2009    coil    HX KNEE ARTHROSCOPY Right 2007    meniscus repaired    HX TONSILLECTOMY      age 9yo    VASCULAR SURGERY PROCEDURE UNLIST      AAA repair       Allergies:  No Known Allergies    Home Medications:  Prior to Admission Medications   Prescriptions Last Dose Informant Patient Reported? Taking? ASPIRIN LOW DOSE PO 8/10/2021 at 0900  Yes Yes   Sig: Take 81 mg by mouth daily. Cholecalciferol, Vitamin D3, 1,000 unit cap 8/10/2021 at Unknown time  Yes Yes   Sig: Take 1,000 Units by mouth daily. OTHER   Yes No   Sig: Take 500 mg by mouth daily. Velvet antler   allopurinoL (ZYLOPRIM) 300 mg tablet 8/10/2021 at 0900  No Yes   Sig: TAKE 1 TABLET BY MOUTH EVERY DAY   ergocalciferol (ERGOCALCIFEROL) 1,250 mcg (50,000 unit) capsule 8/10/2021 at Unknown time  No Yes   Sig: TAKE 1 CAPSULE BY MOUTH ONE TIME PER WEEK   rosuvastatin (CRESTOR) 20 mg tablet 8/10/2021 at 2100  No Yes   Sig: TAKE 1 TABLET BY MOUTH EVERY DAY AT NIGHT      Facility-Administered Medications: None       Hospital Medications:  No current facility-administered medications for this encounter.        Social History:  Social History     Tobacco Use    Smoking status: Current Every Day Smoker     Years: 35.00     Types: Pipe    Smokeless tobacco: Never Used    Tobacco comment: pipe smoker    Substance Use Topics    Alcohol use: Yes     Alcohol/week: 4.0 standard drinks     Types: 4 Standard drinks or equivalent per week     Comment: just wine/one glass of wine daily       Family History:  Family History   Problem Relation Age of Onset    Alcohol abuse Mother     Heart Attack Father          The patient was counseled at length about the risks of elizabeth Covid-19 in the pearl-operative and post-operative states including the recovery window of their procedure. The patient was made aware that elizabeth Covid-19 after a surgical procedure may worsen their prognosis for recovering from the virus and lend to a higher morbidity and or mortality risk. The patient was given the options of postponing their procedure. All of the risks, benefits, and alternatives were discussed. The patient does  wish to proceed with the procedure. Review of Systems:      Constitutional: negative fever, negative chills, negative weight loss  Eyes:   negative visual changes  ENT:   negative sore throat, tongue or lip swelling  Respiratory:  negative cough, negative dyspnea  Cards:  negative for chest pain, palpitations, lower extremity edema  GI:   See HPI  :  negative for frequency, dysuria  Integument:  negative for rash and pruritus  Heme:  negative for easy bruising and gum/nose bleeding  Musculoskel: negative for myalgias,  back pain and muscle weakness  Neuro: negative for headaches, dizziness, vertigo  Psych:  negative for feelings of anxiety, depression       Objective:     Patient Vitals for the past 8 hrs:   BP Temp Pulse Resp SpO2 Height Weight   08/11/21 1413 116/83 98.5 °F (36.9 °C) 77 20 94 % 6' (1.829 m) 99.8 kg (220 lb)     No intake/output data recorded. No intake/output data recorded.     EXAM:     NEURO-a&o   HEENT-wnl   LUNGS-clear    COR-regular rate and rhythym     ABD-soft , no tenderness, no rebound, good bs     EXT-no edema     Data Review     No results for input(s): WBC, HGB, HCT, PLT, HGBEXT, HCTEXT, PLTEXT in the last 72 hours. No results for input(s): NA, K, CL, CO2, BUN, CREA, GLU, PHOS, CA in the last 72 hours. No results for input(s): AP, TBIL, TP, ALB, GLOB, GGT, AML, LPSE in the last 72 hours. No lab exists for component: SGOT, GPT, AMYP, HLPSE  No results for input(s): INR, PTP, APTT, INREXT in the last 72 hours.        Assessment:     · diarrhea     Patient Active Problem List   Diagnosis Code    Gout M10.9    BPH (benign prostatic hyperplasia) N40.0    Brain aneurysm I67.1    Vitamin D deficiency E55.9    Abdominal aortic aneurysm (AAA) without rupture ([facility] I71.4    AAA (abdominal aortic aneurysm) ([facility] I71.4    Short-term memory loss R41.3    Hypercholesteremia E78.00           Plan:   ·   · Endoscopic procedure with sedation     Signed By: Citlaly Murphy MD     8/11/2021  3:21 PM 03-Jul-2023

## (undated) DEVICE — DRAPE XR C ARM 41X74IN LF --

## (undated) DEVICE — COVER,TABLE,60X90,STERILE: Brand: MEDLINE

## (undated) DEVICE — SUTURE MCRYL SZ 4-0 L27IN ABSRB UD L19MM PS-2 1/2 CIR PRIM Y426H

## (undated) DEVICE — SUTURE PROL 5-0 L18IN NONABSORBABLE BLU RB-2 L13MM 1/2 CIR 8713H

## (undated) DEVICE — AMPLATZ EXTRA STIFF  WIRE GUIDE: Brand: AMPLATZ

## (undated) DEVICE — COVER LT HNDL BLU PLAS

## (undated) DEVICE — INFECTION CONTROL KIT SYS

## (undated) DEVICE — SYR 3ML LL TIP 1/10ML GRAD --

## (undated) DEVICE — REM POLYHESIVE ADULT PATIENT RETURN ELECTRODE: Brand: VALLEYLAB

## (undated) DEVICE — PINNACLE INTRODUCER SHEATH: Brand: PINNACLE

## (undated) DEVICE — SUTURE ABSORBABLE BRAIDED 2-0 CT-1 27 IN UD VICRYL J259H

## (undated) DEVICE — Device

## (undated) DEVICE — TUBE EXTN L48IN HI PRSS W/ ROT ADPT

## (undated) DEVICE — SYRINGE MED 20ML STD CLR PLAS LUERLOCK TIP N CTRL DISP

## (undated) DEVICE — 3M™ IOBAN™ 2 ANTIMICROBIAL INCISE DRAPE 6648EZ: Brand: IOBAN™ 2

## (undated) DEVICE — (D)PREP SKN CHLRAPRP APPL 26ML -- CONVERT TO ITEM 371833

## (undated) DEVICE — GUIDEWIRE VASC L260CM DIA0.035IN L7CM DIA3MM J TIP PTFE S

## (undated) DEVICE — GDWIRE ANGIO SUP STF 0.035IN --

## (undated) DEVICE — SUTURE VCRL SZ 3-0 L27IN ABSRB UD L26MM SH 1/2 CIR J416H

## (undated) DEVICE — BLADE ASSEMB CLP HAIR FINE --

## (undated) DEVICE — TRAY CATH OD16FR SIL URIN M STATLOK STBL DEV SURSTP

## (undated) DEVICE — FCPS BX HOT RJ4 2.2MMX240CM -- RADIAL JAW 4 BX/40

## (undated) DEVICE — FORCEPS BX L240CM JAW DIA2.8MM L CAP W/ NDL MIC MESH TOOTH

## (undated) DEVICE — CATH BLLN STENT GRAF 12FRX46MM -- RELIANT

## (undated) DEVICE — DEVON™ KNEE AND BODY STRAP 60" X 3" (1.5 M X 7.6 CM): Brand: DEVON

## (undated) DEVICE — DRAPE,REIN 53X77,STERILE: Brand: MEDLINE

## (undated) DEVICE — VASCULAR-RICHMOND-LF: Brand: MEDLINE INDUSTRIES, INC.

## (undated) DEVICE — DERMABOND SKIN ADH 0.7ML -- DERMABOND ADVANCED 12/BX

## (undated) DEVICE — 3M™ BAIR HUGGER® UNDERBODY BLANKET, FULL ACCESS, 10 PER CASE 63500: Brand: BAIR HUGGER™

## (undated) DEVICE — SOLUTION IV 500ML 0.9% SOD CHL FLX CONT

## (undated) DEVICE — STERILE POLYISOPRENE POWDER-FREE SURGICAL GLOVES: Brand: PROTEXIS

## (undated) DEVICE — FLOSEAL MATRIX IS INDICATED IN SURGICAL PROCEDURES (OTHER THAN IN OPHTHALMIC) AS AN ADJUNCT TO HEMOSTASIS WHEN CONTROL OF BLEEDING BY LIGATURE OR CONVENTIONALPROCEDURES IS INEFFECTIVE OR IMPRACTICAL.: Brand: FLOSEAL HEMOSTATIC MATRIX

## (undated) DEVICE — AMC/4 ARTERIAL NEEDLE – 18GA X 2.75” (7CM): Brand: ARTERIAL NEEDLE

## (undated) DEVICE — RADIFOCUS GLIDEWIRE: Brand: GLIDEWIRE

## (undated) DEVICE — SYR LR LCK 1ML GRAD NSAF 30ML --

## (undated) DEVICE — CATHETER ANGIO PIG MB FLSH 5 FRX65 CM 6 SH SUPER TORQUE